# Patient Record
Sex: FEMALE | Employment: PART TIME | URBAN - METROPOLITAN AREA
[De-identification: names, ages, dates, MRNs, and addresses within clinical notes are randomized per-mention and may not be internally consistent; named-entity substitution may affect disease eponyms.]

---

## 2017-04-03 ENCOUNTER — TELEPHONE (OUTPATIENT)
Dept: FAMILY MEDICINE CLINIC | Facility: CLINIC | Age: 27
End: 2017-04-03

## 2017-04-03 NOTE — TELEPHONE ENCOUNTER
Pt would like another ultrasound done per your advice from August 2016.  First ultrasound was for pelvic pain and you told her to follow up in 6 months with another ultrasound

## 2017-04-03 NOTE — TELEPHONE ENCOUNTER
Pt would like an order to get a repeat ultra sound. Pt states that the last time she had an ultra sound done Dr. Samuel Snyder wanted her to have a repeat ultra sound. Please, call pt with any questions or when the order is in the system.

## 2017-04-22 ENCOUNTER — HOSPITAL ENCOUNTER (OUTPATIENT)
Dept: ULTRASOUND IMAGING | Facility: HOSPITAL | Age: 27
Discharge: HOME | End: 2017-04-22
Attending: FAMILY MEDICINE
Payer: COMMERCIAL

## 2017-04-22 DIAGNOSIS — N83.201 CYST OF RIGHT OVARY: ICD-10-CM

## 2017-04-22 PROCEDURE — 76856 US EXAM PELVIC COMPLETE: CPT

## 2017-04-22 PROCEDURE — 93975 VASCULAR STUDY: CPT

## 2017-04-22 PROCEDURE — 76830 TRANSVAGINAL US NON-OB: CPT

## 2017-04-23 ENCOUNTER — TELEPHONE (OUTPATIENT)
Dept: FAMILY MEDICINE CLINIC | Facility: CLINIC | Age: 27
End: 2017-04-23

## 2017-04-26 ENCOUNTER — TELEPHONE (OUTPATIENT)
Dept: FAMILY MEDICINE CLINIC | Facility: CLINIC | Age: 27
End: 2017-04-26

## 2017-04-26 DIAGNOSIS — N83.209 CYST OF OVARY, UNSPECIFIED LATERALITY: Primary | ICD-10-CM

## 2017-04-26 DIAGNOSIS — R93.89 ABNORMAL ULTRASOUND: ICD-10-CM

## 2017-04-26 NOTE — TELEPHONE ENCOUNTER
Patient is leaving for out of town on Friday cannot make apt and will not be back till May 8 th --  She was suppose to come into speak to doctor about her test results ( ovarian cyst )  Asked if she can speak to doctor over the phone

## 2017-04-27 NOTE — TELEPHONE ENCOUNTER
Please call the patient. She still has some cysts in the ovaries. Even though they are smaller. She needs to see Ob Gyn/ I will pend referral. Let me know.  Send her also recommendations and copy of the results

## 2017-10-10 ENCOUNTER — OFFICE VISIT (OUTPATIENT)
Dept: FAMILY MEDICINE CLINIC | Facility: CLINIC | Age: 27
End: 2017-10-10

## 2017-10-10 VITALS
WEIGHT: 175 LBS | HEART RATE: 74 BPM | SYSTOLIC BLOOD PRESSURE: 128 MMHG | TEMPERATURE: 98 F | DIASTOLIC BLOOD PRESSURE: 79 MMHG

## 2017-10-10 DIAGNOSIS — M25.561 RIGHT KNEE PAIN, UNSPECIFIED CHRONICITY: Primary | ICD-10-CM

## 2017-10-10 PROCEDURE — 99212 OFFICE O/P EST SF 10 MIN: CPT | Performed by: FAMILY MEDICINE

## 2017-10-10 PROCEDURE — 99213 OFFICE O/P EST LOW 20 MIN: CPT | Performed by: FAMILY MEDICINE

## 2017-10-10 RX ORDER — DOXYCYCLINE HYCLATE 150 MG/1
TABLET, DELAYED RELEASE ORAL
COMMUNITY
Start: 2017-09-23

## 2017-10-10 RX ORDER — ADAPALENE AND BENZOYL PEROXIDE 3; 25 MG/G; MG/G
GEL TOPICAL
COMMUNITY
Start: 2017-08-24

## 2017-10-10 NOTE — PROGRESS NOTES
HPI:    Patient ID: Aaron Garibay is a 23year old female. Few days ago started with the pain underneath the knee cap. Has pain. Worse with walking. [place] on it 2 times in the past but got better very fast. Now hurting for few days getting better.          R

## 2017-10-27 ENCOUNTER — HOSPITAL ENCOUNTER (OUTPATIENT)
Dept: GENERAL RADIOLOGY | Facility: HOSPITAL | Age: 27
Discharge: HOME | End: 2017-10-27
Attending: FAMILY MEDICINE
Payer: COMMERCIAL

## 2017-10-27 DIAGNOSIS — M25.561 RIGHT KNEE PAIN, UNSPECIFIED CHRONICITY: ICD-10-CM

## 2017-10-27 PROCEDURE — 73562 X-RAY EXAM OF KNEE 3: CPT | Performed by: FAMILY MEDICINE

## 2017-11-02 ENCOUNTER — TELEPHONE (OUTPATIENT)
Dept: FAMILY MEDICINE CLINIC | Facility: CLINIC | Age: 27
End: 2017-11-02

## 2017-11-10 NOTE — TELEPHONE ENCOUNTER
Pt called in to follow up on x-ray results. Pt states she had her x-ray on 10/27 and has not received a call back.

## 2017-11-13 NOTE — TELEPHONE ENCOUNTER
Notes Recorded by Kusum Monzon MD on 11/11/2017 at 6:42 PM CST  I spoke to the patient about the results. Patient is pain free now,   If starts having more pain advised to see ortho. Does not want to do it now.    Also strongly advised to loose weight

## 2018-01-12 ENCOUNTER — OFFICE VISIT (OUTPATIENT)
Dept: FAMILY MEDICINE CLINIC | Facility: CLINIC | Age: 28
End: 2018-01-12

## 2018-01-12 VITALS
SYSTOLIC BLOOD PRESSURE: 107 MMHG | WEIGHT: 175 LBS | HEART RATE: 69 BPM | DIASTOLIC BLOOD PRESSURE: 74 MMHG | TEMPERATURE: 97 F

## 2018-01-12 DIAGNOSIS — M54.89 OTHER BACK PAIN, UNSPECIFIED CHRONICITY: Primary | ICD-10-CM

## 2018-01-12 PROCEDURE — 99213 OFFICE O/P EST LOW 20 MIN: CPT | Performed by: FAMILY MEDICINE

## 2018-01-12 PROCEDURE — 99212 OFFICE O/P EST SF 10 MIN: CPT | Performed by: FAMILY MEDICINE

## 2018-01-12 RX ORDER — NAPROXEN 500 MG/1
500 TABLET ORAL 2 TIMES DAILY WITH MEALS
Qty: 50 TABLET | Refills: 0 | Status: SHIPPED | OUTPATIENT
Start: 2018-01-12 | End: 2019-03-29 | Stop reason: ALTCHOICE

## 2018-01-12 NOTE — PROGRESS NOTES
HPI:    Patient ID: Delfina Cason is a 33year old female. Left sided low back pain since few days ago. Worse when she changes position if kees walking better. No neuro deficits. Review of Systems   Constitutional: Negative.   Negative for activit

## 2019-03-29 ENCOUNTER — OFFICE VISIT (OUTPATIENT)
Dept: FAMILY MEDICINE CLINIC | Facility: CLINIC | Age: 29
End: 2019-03-29
Payer: COMMERCIAL

## 2019-03-29 VITALS
BODY MASS INDEX: 27.62 KG/M2 | SYSTOLIC BLOOD PRESSURE: 108 MMHG | TEMPERATURE: 97 F | WEIGHT: 176 LBS | HEART RATE: 79 BPM | HEIGHT: 67 IN | DIASTOLIC BLOOD PRESSURE: 71 MMHG

## 2019-03-29 DIAGNOSIS — M72.2 PLANTAR FASCIITIS: Primary | ICD-10-CM

## 2019-03-29 PROCEDURE — 99213 OFFICE O/P EST LOW 20 MIN: CPT | Performed by: PHYSICIAN ASSISTANT

## 2019-03-29 PROCEDURE — 99212 OFFICE O/P EST SF 10 MIN: CPT | Performed by: PHYSICIAN ASSISTANT

## 2019-03-29 NOTE — PROGRESS NOTES
HPI:    Patient ID: Graciela Martin is a 33year old female. Patient presents for the left foot pain for the past 3 days. Denies any trauma/injury to foot. She ran 1.5 miles the day before and a lot of walking on Sunday.  She iced the foot and Advil with no r effusion. Tenderness in plantar region of foot on medial side. No edema or leg swelling noted at this time. No pain in calf region b/l noted at this time. Neurological: She is alert and oriented to person, place, and time. She displays normal reflexes.

## 2019-03-29 NOTE — PATIENT INSTRUCTIONS
You can use ice and heat to help relieve some of the inflammation  You can take Advil for the pain and inflammation  Take tennis balls or towels that are rolled up tightly and massage the bottom of your feet every night to help with pain/tenderness.    You barefoot. · Premade or custom-fitted shoe inserts may be helpful. Inserts made of silicone seem to be the most effective.  Custom-made inserts can be provided by a podiatrist or foot specialist, physical therapist, or orthopedist.  · Premade or custom-made instructions.

## 2019-06-12 ENCOUNTER — OFFICE VISIT (OUTPATIENT)
Dept: FAMILY MEDICINE CLINIC | Facility: CLINIC | Age: 29
End: 2019-06-12
Payer: COMMERCIAL

## 2019-06-12 VITALS
TEMPERATURE: 98 F | BODY MASS INDEX: 28 KG/M2 | SYSTOLIC BLOOD PRESSURE: 110 MMHG | WEIGHT: 180 LBS | HEART RATE: 60 BPM | DIASTOLIC BLOOD PRESSURE: 73 MMHG

## 2019-06-12 DIAGNOSIS — Z30.9 ENCOUNTER FOR CONTRACEPTIVE MANAGEMENT, UNSPECIFIED TYPE: Primary | ICD-10-CM

## 2019-06-12 PROCEDURE — 99212 OFFICE O/P EST SF 10 MIN: CPT | Performed by: FAMILY MEDICINE

## 2019-06-12 PROCEDURE — 99213 OFFICE O/P EST LOW 20 MIN: CPT | Performed by: FAMILY MEDICINE

## 2019-06-12 RX ORDER — NORGESTIMATE AND ETHINYL ESTRADIOL 7DAYSX3 LO
1 KIT ORAL DAILY
Qty: 3 PACKAGE | Refills: 1 | Status: SHIPPED | OUTPATIENT
Start: 2019-06-12 | End: 2019-11-22

## 2019-06-12 RX ORDER — CLINDAMYCIN PHOSPHATE 10 MG/ML
SOLUTION TOPICAL
Refills: 4 | COMMUNITY
Start: 2019-03-17

## 2019-06-12 NOTE — PROGRESS NOTES
HPI:    Patient ID: Venkat Tsang is a 33year old female. Patient here for bcp. Needs info. Needs pap test and will come next month. Otherwise well      Review of Systems   Constitutional: Negative.   Negative for activity change, chills and diaphoresi

## 2019-06-19 ENCOUNTER — OFFICE VISIT (OUTPATIENT)
Dept: FAMILY MEDICINE CLINIC | Facility: CLINIC | Age: 29
End: 2019-06-19
Payer: COMMERCIAL

## 2019-06-19 VITALS
HEIGHT: 67 IN | BODY MASS INDEX: 28.09 KG/M2 | SYSTOLIC BLOOD PRESSURE: 106 MMHG | DIASTOLIC BLOOD PRESSURE: 70 MMHG | WEIGHT: 179 LBS | HEART RATE: 74 BPM

## 2019-06-19 DIAGNOSIS — Z02.1 PHYSICAL EXAM, PRE-EMPLOYMENT: Primary | ICD-10-CM

## 2019-06-19 PROCEDURE — 99395 PREV VISIT EST AGE 18-39: CPT | Performed by: FAMILY MEDICINE

## 2019-06-19 NOTE — PROGRESS NOTES
HPI:    Patient ID: Joy Rueda is a 33year old female. Here for work physical. No complaints      Review of Systems   Constitutional: Negative. Negative for activity change, appetite change, diaphoresis, fatigue and fever. HENT: Negative.     [facility]

## 2019-11-22 RX ORDER — NORGESTIMATE AND ETHINYL ESTRADIOL
KIT
Qty: 84 TABLET | Refills: 1 | Status: SHIPPED | OUTPATIENT
Start: 2019-11-22 | End: 2020-05-11

## 2019-11-22 NOTE — TELEPHONE ENCOUNTER
Please review; protocol failed.   LR 6/12/19    Gynecology Medications  Protocol Criteria:  · Appointment scheduled in the past 12 months or the next 3 months  · Pap smear in the past 12 months  · Pap smear WNL manually verified  Recent Outpatient Visits

## 2020-05-11 RX ORDER — NORGESTIMATE AND ETHINYL ESTRADIOL
KIT
Qty: 28 TABLET | Refills: 0 | Status: SHIPPED | OUTPATIENT
Start: 2020-05-11 | End: 2020-06-09

## 2020-05-29 ENCOUNTER — VIRTUAL PHONE E/M (OUTPATIENT)
Dept: FAMILY MEDICINE CLINIC | Facility: CLINIC | Age: 30
End: 2020-05-29
Payer: COMMERCIAL

## 2020-05-29 ENCOUNTER — NURSE TRIAGE (OUTPATIENT)
Dept: FAMILY MEDICINE CLINIC | Facility: CLINIC | Age: 30
End: 2020-05-29

## 2020-05-29 ENCOUNTER — TELEPHONE (OUTPATIENT)
Dept: FAMILY MEDICINE CLINIC | Facility: CLINIC | Age: 30
End: 2020-05-29

## 2020-05-29 DIAGNOSIS — Z20.822 EXPOSURE TO COVID-19 VIRUS: Primary | ICD-10-CM

## 2020-05-29 PROCEDURE — 99213 OFFICE O/P EST LOW 20 MIN: CPT | Performed by: PHYSICIAN ASSISTANT

## 2020-05-29 NOTE — PROGRESS NOTES
Please note that the following visit was completed using two-way, real-time interactive audio and/or video communication.   This has been done in good kaveh to provide continuity of care in the best interest of the provider-patient relationship, due to the and negatives noted in the the HPI.          Current Outpatient Medications   Medication Sig Dispense Refill   • TRI-LO-MAAME 0.18/0.215/0.25 MG-25 MCG Oral Tab TAKE 1 TABLET BY MOUTH EVERY DAY 28 tablet 0   • Clindamycin Phosphate 1 % External Swab APPLY meetings/ gatherings in face of this Covid 19 pandemic. Patient verbalized understanding of plan and all questions answered to the best of my ability. Patient to call back if any change/ worsening of symptoms.     No orders of the defined types were p

## 2020-05-29 NOTE — TELEPHONE ENCOUNTER
Action Requested: Summary for Provider     []  Critical Lab, Recommendations Needed  [] Need Additional Advice  []   FYI    []   Need Orders  [] Need Medications Sent to Pharmacy  []  Other     SUMMARY: Televisit scheduled.      Reason for call: Headache (c

## 2020-06-01 ENCOUNTER — TELEPHONE (OUTPATIENT)
Dept: FAMILY MEDICINE CLINIC | Facility: CLINIC | Age: 30
End: 2020-06-01

## 2020-06-01 NOTE — TELEPHONE ENCOUNTER
Reports had virtual visit with Blaine Chavez [place] and reports her COVID-19 test result is delayed--reports out-side facility \"is backed up. \" Pt states has been symptom free since yesterday (HA, sore throat, diarrhea resolved) and reports wants to return to work

## 2020-06-01 NOTE — TELEPHONE ENCOUNTER
Agree. Patient needs to get results first to ensure she did not have COVID. Depending on results we determine when she can return to work.

## 2020-06-09 RX ORDER — NORGESTIMATE AND ETHINYL ESTRADIOL
KIT
Qty: 28 TABLET | Refills: 0 | Status: SHIPPED | OUTPATIENT
Start: 2020-06-09 | End: 2020-07-08

## 2020-07-08 RX ORDER — NORGESTIMATE AND ETHINYL ESTRADIOL
KIT
Qty: 28 TABLET | Refills: 0 | Status: SHIPPED | OUTPATIENT
Start: 2020-07-08 | End: 2020-08-18

## 2020-08-05 RX ORDER — NORGESTIMATE AND ETHINYL ESTRADIOL
KIT
Qty: 28 TABLET | Refills: 0 | OUTPATIENT
Start: 2020-08-05

## 2020-08-18 RX ORDER — NORGESTIMATE AND ETHINYL ESTRADIOL
KIT
Qty: 28 TABLET | Refills: 0 | Status: SHIPPED | OUTPATIENT
Start: 2020-08-18 | End: 2020-09-08

## 2020-09-08 RX ORDER — NORGESTIMATE AND ETHINYL ESTRADIOL
KIT
Qty: 28 TABLET | Refills: 0 | Status: SHIPPED | OUTPATIENT
Start: 2020-09-08

## 2020-10-06 RX ORDER — NORGESTIMATE AND ETHINYL ESTRADIOL
KIT
Qty: 28 TABLET | Refills: 0 | OUTPATIENT
Start: 2020-10-06

## 2021-01-30 ENCOUNTER — OFFICE VISIT (OUTPATIENT)
Dept: FAMILY MEDICINE CLINIC | Facility: CLINIC | Age: 31
End: 2021-01-30
Payer: COMMERCIAL

## 2021-01-30 VITALS
SYSTOLIC BLOOD PRESSURE: 131 MMHG | HEART RATE: 86 BPM | BODY MASS INDEX: 31.55 KG/M2 | HEIGHT: 67 IN | DIASTOLIC BLOOD PRESSURE: 79 MMHG | WEIGHT: 201 LBS

## 2021-01-30 DIAGNOSIS — Z32.01 POSITIVE PREGNANCY TEST: Primary | ICD-10-CM

## 2021-01-30 LAB
CONTROL LINE PRESENT WITH A CLEAR BACKGROUND (YES/NO): YES YES/NO
PREGNANCY TEST, URINE: POSITIVE

## 2021-01-30 PROCEDURE — 99213 OFFICE O/P EST LOW 20 MIN: CPT | Performed by: FAMILY MEDICINE

## 2021-01-30 PROCEDURE — 3075F SYST BP GE 130 - 139MM HG: CPT | Performed by: FAMILY MEDICINE

## 2021-01-30 PROCEDURE — 3078F DIAST BP <80 MM HG: CPT | Performed by: FAMILY MEDICINE

## 2021-01-30 PROCEDURE — 3008F BODY MASS INDEX DOCD: CPT | Performed by: FAMILY MEDICINE

## 2021-01-30 PROCEDURE — 81025 URINE PREGNANCY TEST: CPT | Performed by: FAMILY MEDICINE

## 2021-01-30 RX ORDER — AZELAIC ACID 0.15 G/G
GEL TOPICAL
COMMUNITY
Start: 2021-01-28

## 2021-01-30 NOTE — PROGRESS NOTES
HPI:    Patient ID: Aysha Stover is a 35year old female. Patient pregnant. No any abdominal pain cramps, menstrual bleeding. Does not want to keep the pregnancy. Review of Systems  Constitutional: Negative.   Negative for activity change, appeti

## 2022-06-01 ENCOUNTER — TELEPHONE (OUTPATIENT)
Dept: FAMILY MEDICINE CLINIC | Facility: CLINIC | Age: 32
End: 2022-06-01

## 2022-06-01 ENCOUNTER — NURSE TRIAGE (OUTPATIENT)
Dept: FAMILY MEDICINE CLINIC | Facility: CLINIC | Age: 32
End: 2022-06-01

## 2022-06-01 LAB — AMB EXT COVID-19 RESULT: DETECTED

## 2022-06-01 NOTE — TELEPHONE ENCOUNTER
Patient spoke with nurse this morning and would like note to return to work 6/6, please send through luiz Singer.

## 2022-12-16 ENCOUNTER — TELEMEDICINE (OUTPATIENT)
Dept: FAMILY MEDICINE CLINIC | Facility: CLINIC | Age: 32
End: 2022-12-16
Payer: COMMERCIAL

## 2022-12-16 ENCOUNTER — TELEPHONE (OUTPATIENT)
Dept: FAMILY MEDICINE CLINIC | Facility: CLINIC | Age: 32
End: 2022-12-16

## 2022-12-16 DIAGNOSIS — M79.673 PAIN OF FOOT, UNSPECIFIED LATERALITY: Primary | ICD-10-CM

## 2022-12-16 RX ORDER — MELOXICAM 15 MG/1
15 TABLET ORAL DAILY
Qty: 20 TABLET | Refills: 0 | Status: SHIPPED | OUTPATIENT
Start: 2022-12-16

## 2022-12-19 NOTE — TELEPHONE ENCOUNTER
Letter sent to anthony leo
Patient called office, saying she missed a call. Patient's date of birth and full name both confirmed. Chart reviewed. No notes - chart review , test results, Appointment Desk. RN suggested patient to listen to voicemail. She is also looking for letter. RN advised that this letter was release to her earlier today around 11am.. Advised her to restart her mychart. Provided mychart help desk number. She verbalizes understanding of all information, and agreeable to plan.
Patient states can not see letter in [address], please resend.
No

## 2023-03-01 ENCOUNTER — NURSE TRIAGE (OUTPATIENT)
Dept: FAMILY MEDICINE CLINIC | Facility: CLINIC | Age: 33
End: 2023-03-01

## 2023-03-01 ENCOUNTER — OFFICE VISIT (OUTPATIENT)
Dept: FAMILY MEDICINE CLINIC | Facility: CLINIC | Age: 33
End: 2023-03-01

## 2023-03-01 ENCOUNTER — LAB ENCOUNTER (OUTPATIENT)
Dept: LAB | Age: 33
End: 2023-03-01
Attending: PHYSICIAN ASSISTANT
Payer: COMMERCIAL

## 2023-03-01 VITALS
WEIGHT: 209 LBS | HEIGHT: 67 IN | BODY MASS INDEX: 32.8 KG/M2 | SYSTOLIC BLOOD PRESSURE: 127 MMHG | DIASTOLIC BLOOD PRESSURE: 86 MMHG | HEART RATE: 81 BPM

## 2023-03-01 DIAGNOSIS — N83.201 CYSTS OF BOTH OVARIES: ICD-10-CM

## 2023-03-01 DIAGNOSIS — R10.84 GENERALIZED ABDOMINAL PAIN: ICD-10-CM

## 2023-03-01 DIAGNOSIS — R10.31 RLQ ABDOMINAL PAIN: ICD-10-CM

## 2023-03-01 DIAGNOSIS — R82.90 ABNORMAL URINALYSIS: ICD-10-CM

## 2023-03-01 DIAGNOSIS — N83.202 CYSTS OF BOTH OVARIES: ICD-10-CM

## 2023-03-01 DIAGNOSIS — R10.2 PELVIC PAIN: Primary | ICD-10-CM

## 2023-03-01 LAB
ALBUMIN SERPL-MCNC: 3.7 G/DL (ref 3.4–5)
ALBUMIN/GLOB SERPL: 0.9 {RATIO} (ref 1–2)
ALP LIVER SERPL-CCNC: 90 U/L
ALT SERPL-CCNC: 31 U/L
ANION GAP SERPL CALC-SCNC: 10 MMOL/L (ref 0–18)
APPEARANCE: CLEAR
AST SERPL-CCNC: 17 U/L (ref 15–37)
BASOPHILS # BLD AUTO: 0.06 X10(3) UL (ref 0–0.2)
BASOPHILS NFR BLD AUTO: 0.5 %
BILIRUB SERPL-MCNC: 0.3 MG/DL (ref 0.1–2)
BILIRUBIN: NEGATIVE
BUN BLD-MCNC: 14 MG/DL (ref 7–18)
BUN/CREAT SERPL: 22.2 (ref 10–20)
CALCIUM BLD-MCNC: 9 MG/DL (ref 8.5–10.1)
CHLORIDE SERPL-SCNC: 106 MMOL/L (ref 98–112)
CO2 SERPL-SCNC: 24 MMOL/L (ref 21–32)
CREAT BLD-MCNC: 0.63 MG/DL
DEPRECATED RDW RBC AUTO: 39.2 FL (ref 35.1–46.3)
EOSINOPHIL # BLD AUTO: 0.07 X10(3) UL (ref 0–0.7)
EOSINOPHIL NFR BLD AUTO: 0.6 %
ERYTHROCYTE [DISTWIDTH] IN BLOOD BY AUTOMATED COUNT: 11.9 % (ref 11–15)
FASTING STATUS PATIENT QL REPORTED: YES
GFR SERPLBLD BASED ON 1.73 SQ M-ARVRAT: 122 ML/MIN/1.73M2 (ref 60–?)
GLOBULIN PLAS-MCNC: 3.9 G/DL (ref 2.8–4.4)
GLUCOSE (URINE DIPSTICK): NEGATIVE MG/DL
GLUCOSE BLD-MCNC: 81 MG/DL (ref 70–99)
HCT VFR BLD AUTO: 40.1 %
HGB BLD-MCNC: 13.1 G/DL
IMM GRANULOCYTES # BLD AUTO: 0.04 X10(3) UL (ref 0–1)
IMM GRANULOCYTES NFR BLD: 0.4 %
KETONES (URINE DIPSTICK): NEGATIVE MG/DL
LIPASE SERPL-CCNC: 111 U/L (ref 73–393)
LIPASE SERPL-CCNC: 26 U/L (ref 13–75)
LYMPHOCYTES # BLD AUTO: 2.27 X10(3) UL (ref 1–4)
LYMPHOCYTES NFR BLD AUTO: 20.1 %
MCH RBC QN AUTO: 29.5 PG (ref 26–34)
MCHC RBC AUTO-ENTMCNC: 32.7 G/DL (ref 31–37)
MCV RBC AUTO: 90.3 FL
MONOCYTES # BLD AUTO: 0.66 X10(3) UL (ref 0.1–1)
MONOCYTES NFR BLD AUTO: 5.9 %
MULTISTIX LOT#: ABNORMAL NUMERIC
NEUTROPHILS # BLD AUTO: 8.18 X10 (3) UL (ref 1.5–7.7)
NEUTROPHILS # BLD AUTO: 8.18 X10(3) UL (ref 1.5–7.7)
NEUTROPHILS NFR BLD AUTO: 72.5 %
NITRITE, URINE: NEGATIVE
OSMOLALITY SERPL CALC.SUM OF ELEC: 290 MOSM/KG (ref 275–295)
PH, URINE: 6.5 (ref 4.5–8)
PLATELET # BLD AUTO: 343 10(3)UL (ref 150–450)
POTASSIUM SERPL-SCNC: 3.7 MMOL/L (ref 3.5–5.1)
PROT SERPL-MCNC: 7.6 G/DL (ref 6.4–8.2)
RBC # BLD AUTO: 4.44 X10(6)UL
SODIUM SERPL-SCNC: 140 MMOL/L (ref 136–145)
SPECIFIC GRAVITY: 1.02 (ref 1–1.03)
URINE-COLOR: YELLOW
UROBILINOGEN,SEMI-QN: 0.2 MG/DL (ref 0–1.9)
WBC # BLD AUTO: 11.3 X10(3) UL (ref 4–11)

## 2023-03-01 PROCEDURE — 80053 COMPREHEN METABOLIC PANEL: CPT

## 2023-03-01 PROCEDURE — 3079F DIAST BP 80-89 MM HG: CPT | Performed by: PHYSICIAN ASSISTANT

## 2023-03-01 PROCEDURE — 3074F SYST BP LT 130 MM HG: CPT | Performed by: PHYSICIAN ASSISTANT

## 2023-03-01 PROCEDURE — 36415 COLL VENOUS BLD VENIPUNCTURE: CPT

## 2023-03-01 PROCEDURE — 85025 COMPLETE CBC W/AUTO DIFF WBC: CPT

## 2023-03-01 PROCEDURE — 3008F BODY MASS INDEX DOCD: CPT | Performed by: PHYSICIAN ASSISTANT

## 2023-03-01 PROCEDURE — 83690 ASSAY OF LIPASE: CPT

## 2023-03-01 PROCEDURE — 99214 OFFICE O/P EST MOD 30 MIN: CPT | Performed by: PHYSICIAN ASSISTANT

## 2023-03-01 PROCEDURE — 81002 URINALYSIS NONAUTO W/O SCOPE: CPT | Performed by: PHYSICIAN ASSISTANT

## 2023-03-01 RX ORDER — NAPROXEN 500 MG/1
500 TABLET ORAL 2 TIMES DAILY WITH MEALS
Qty: 60 TABLET | Refills: 0 | Status: SHIPPED | OUTPATIENT
Start: 2023-03-01

## 2023-03-02 ENCOUNTER — TELEPHONE (OUTPATIENT)
Dept: FAMILY MEDICINE CLINIC | Facility: CLINIC | Age: 33
End: 2023-03-02

## 2023-03-02 DIAGNOSIS — R10.2 PAIN IN PELVIS: Primary | ICD-10-CM

## 2023-03-02 NOTE — TELEPHONE ENCOUNTER
Spoke with patient,  verified. Informed of new STAT order. She will call to schedule. Pain is still there, rated 5/10. Advised her to call with update in morning.

## 2023-03-02 NOTE — TELEPHONE ENCOUNTER
Patient called, states that she was scheduled on 3/6/23 for an [address] but they called her and cancelled it and now she is scheduled on 3/21/23. Was instructed to call PCP to change the order to STAT if she would like an earlier appointment. Per patient, she is still in pain and would like to do it earlier than 3/21/23, requesting STAT order . Informed that she is still scheduled for 3/6/23 and 3/21/23. Per patient ,the US tech told her that they needed longer time for the doppler that is why they have to changed it to 3/21/23. Manuel Restrepo Patient would like to know what was the season  the order was changed-Informed that the previous order was the wrong test ,now changed to US pelvis with doppler. RN called [facility] (central scheduling ) and left a complete message including patient's name and , our office number and hour to call us back regarding the schedule. ONCE ROCIO CALLED BACK=please clarify if we could use the 3/6/23 for the [address] with doppler or any earlier appointment than 3/21/23. If there is no earlier appointment, please asks . Rosa Elena Parra, if she could change it to STAT.          Future Appointments   Date Time Provider Jong Dunham   3/6/2023  7:30 AM ADO US RM1 ADO US NESSA Sadler   3/8/2023 10:30 [facility]   3/21/2023  1:00 PM [address] [place] [address]

## 2023-03-02 NOTE — TELEPHONE ENCOUNTER
Mick Conte, please advise    Do you want the ultrasound order to be changed to \"Stat\"? Patient is calling regarding pain and waiting for next ultrasound appt. See Message below. Appt for ultrasound is 3/21/23. ONCE ROCIO CALLED BACK=please clarify if we could use the 3/6/23 for the [address] with doppler or any earlier appointment than 3/21/23. If there is no earlier appointment, please asks . Izzy Rojas, if she could change it to STAT. Central Scheduling is calling to confirm that the ultrasound order was changed by Provider to a pelvic ultrasound. Per chart review, order was changed after office visit 3/1/23.

## 2023-03-02 NOTE — TELEPHONE ENCOUNTER
Patient requesting call from office states she scheduled her ultrasound but it was canceled due to order being canceled.

## 2023-03-03 ENCOUNTER — HOSPITAL ENCOUNTER (OUTPATIENT)
Dept: ULTRASOUND IMAGING | Facility: HOSPITAL | Age: 33
Discharge: HOME | End: 2023-03-03
Attending: PHYSICIAN ASSISTANT
Payer: COMMERCIAL

## 2023-03-03 DIAGNOSIS — R10.2 PAIN IN PELVIS: ICD-10-CM

## 2023-03-03 PROCEDURE — 76830 TRANSVAGINAL US NON-OB: CPT | Performed by: PHYSICIAN ASSISTANT

## 2023-03-03 PROCEDURE — 93975 VASCULAR STUDY: CPT | Performed by: PHYSICIAN ASSISTANT

## 2023-03-03 PROCEDURE — 76856 US EXAM PELVIC COMPLETE: CPT | Performed by: PHYSICIAN ASSISTANT

## 2023-03-03 RX ORDER — TRAMADOL HYDROCHLORIDE 50 MG/1
50 TABLET ORAL EVERY 6 HOURS PRN
Qty: 15 TABLET | Refills: 0 | Status: SHIPPED | OUTPATIENT
Start: 2023-03-03

## 2023-03-08 ENCOUNTER — OFFICE VISIT (OUTPATIENT)
Dept: FAMILY MEDICINE CLINIC | Facility: CLINIC | Age: 33
End: 2023-03-08

## 2023-03-08 VITALS
SYSTOLIC BLOOD PRESSURE: 118 MMHG | RESPIRATION RATE: 20 BRPM | DIASTOLIC BLOOD PRESSURE: 74 MMHG | HEART RATE: 77 BPM | HEIGHT: 67 IN | BODY MASS INDEX: 33.12 KG/M2 | OXYGEN SATURATION: 98 % | WEIGHT: 211 LBS

## 2023-03-08 DIAGNOSIS — R68.84 JAW PAIN: Primary | ICD-10-CM

## 2023-03-08 PROCEDURE — 99213 OFFICE O/P EST LOW 20 MIN: CPT | Performed by: FAMILY MEDICINE

## 2023-03-08 PROCEDURE — 3074F SYST BP LT 130 MM HG: CPT | Performed by: FAMILY MEDICINE

## 2023-03-08 PROCEDURE — 3078F DIAST BP <80 MM HG: CPT | Performed by: FAMILY MEDICINE

## 2023-03-08 PROCEDURE — 3008F BODY MASS INDEX DOCD: CPT | Performed by: FAMILY MEDICINE

## 2023-04-21 ENCOUNTER — OFFICE VISIT (OUTPATIENT)
Dept: OBGYN CLINIC | Facility: CLINIC | Age: 33
End: 2023-04-21

## 2023-04-21 VITALS
BODY MASS INDEX: 33 KG/M2 | SYSTOLIC BLOOD PRESSURE: 127 MMHG | HEART RATE: 79 BPM | WEIGHT: 212.63 LBS | DIASTOLIC BLOOD PRESSURE: 81 MMHG

## 2023-04-21 DIAGNOSIS — R10.2 PELVIC PAIN: Primary | ICD-10-CM

## 2023-04-21 PROCEDURE — 3079F DIAST BP 80-89 MM HG: CPT | Performed by: OBSTETRICS & GYNECOLOGY

## 2023-04-21 PROCEDURE — 99202 OFFICE O/P NEW SF 15 MIN: CPT | Performed by: OBSTETRICS & GYNECOLOGY

## 2023-04-21 PROCEDURE — 3074F SYST BP LT 130 MM HG: CPT | Performed by: OBSTETRICS & GYNECOLOGY

## 2023-04-24 ENCOUNTER — OFFICE VISIT (OUTPATIENT)
Dept: OTOLARYNGOLOGY | Facility: CLINIC | Age: 33
End: 2023-04-24

## 2023-04-24 VITALS — BODY MASS INDEX: 33.27 KG/M2 | WEIGHT: 212 LBS | HEIGHT: 67 IN

## 2023-04-24 DIAGNOSIS — R59.0 SUBMENTAL LYMPHADENOPATHY: Primary | ICD-10-CM

## 2023-04-24 PROCEDURE — 99243 OFF/OP CNSLTJ NEW/EST LOW 30: CPT | Performed by: STUDENT IN AN ORGANIZED HEALTH CARE EDUCATION/TRAINING PROGRAM

## 2023-04-24 PROCEDURE — 3008F BODY MASS INDEX DOCD: CPT | Performed by: STUDENT IN AN ORGANIZED HEALTH CARE EDUCATION/TRAINING PROGRAM

## 2023-05-03 NOTE — TELEPHONE ENCOUNTER
Also, Naproxen helps to control the pain? Otherwise, patient may try Tramadol. Patient: Jose Francisco Gonzalez  : 1937      ORDERS:    BMP on  (hyponatremia, hypotension)        DEE DEE Schilling CNP on 5/3/2023 at 1:08 PM

## 2023-05-11 ENCOUNTER — HOSPITAL ENCOUNTER (OUTPATIENT)
Dept: ULTRASOUND IMAGING | Age: 33
Discharge: HOME | End: 2023-05-11
Attending: STUDENT IN AN ORGANIZED HEALTH CARE EDUCATION/TRAINING PROGRAM
Payer: COMMERCIAL

## 2023-05-11 DIAGNOSIS — R59.0 SUBMENTAL LYMPHADENOPATHY: ICD-10-CM

## 2023-05-11 PROCEDURE — 76536 US EXAM OF HEAD AND NECK: CPT | Performed by: STUDENT IN AN ORGANIZED HEALTH CARE EDUCATION/TRAINING PROGRAM

## 2024-06-06 ENCOUNTER — OFFICE VISIT (OUTPATIENT)
Dept: FAMILY MEDICINE CLINIC | Facility: CLINIC | Age: 34
End: 2024-06-06
Payer: COMMERCIAL

## 2024-06-06 ENCOUNTER — LAB ENCOUNTER (OUTPATIENT)
Dept: LAB | Age: 34
End: 2024-06-06
Attending: FAMILY MEDICINE
Payer: COMMERCIAL

## 2024-06-06 VITALS
SYSTOLIC BLOOD PRESSURE: 118 MMHG | DIASTOLIC BLOOD PRESSURE: 74 MMHG | HEIGHT: 67 IN | WEIGHT: 198.81 LBS | RESPIRATION RATE: 18 BRPM | OXYGEN SATURATION: 98 % | BODY MASS INDEX: 31.2 KG/M2 | HEART RATE: 72 BPM

## 2024-06-06 DIAGNOSIS — L80 VITILIGO: ICD-10-CM

## 2024-06-06 DIAGNOSIS — N83.209 CYST OF OVARY, UNSPECIFIED LATERALITY: Primary | ICD-10-CM

## 2024-06-06 DIAGNOSIS — Z00.00 ANNUAL PHYSICAL EXAM: ICD-10-CM

## 2024-06-06 LAB
ALBUMIN SERPL-MCNC: 4.5 G/DL (ref 3.2–4.8)
ALBUMIN/GLOB SERPL: 1.5 {RATIO} (ref 1–2)
ALP LIVER SERPL-CCNC: 79 U/L
ALT SERPL-CCNC: 26 U/L
ANION GAP SERPL CALC-SCNC: 9 MMOL/L (ref 0–18)
AST SERPL-CCNC: 21 U/L (ref ?–34)
BASOPHILS # BLD AUTO: 0.06 X10(3) UL (ref 0–0.2)
BASOPHILS NFR BLD AUTO: 0.6 %
BILIRUB SERPL-MCNC: 0.5 MG/DL (ref 0.3–1.2)
BUN BLD-MCNC: 8 MG/DL (ref 9–23)
BUN/CREAT SERPL: 11 (ref 10–20)
CALCIUM BLD-MCNC: 9.5 MG/DL (ref 8.7–10.4)
CHLORIDE SERPL-SCNC: 106 MMOL/L (ref 98–112)
CHOLEST SERPL-MCNC: 164 MG/DL (ref ?–200)
CO2 SERPL-SCNC: 24 MMOL/L (ref 21–32)
CREAT BLD-MCNC: 0.73 MG/DL
DEPRECATED RDW RBC AUTO: 40.8 FL (ref 35.1–46.3)
EGFRCR SERPLBLD CKD-EPI 2021: 112 ML/MIN/1.73M2 (ref 60–?)
EOSINOPHIL # BLD AUTO: 0.16 X10(3) UL (ref 0–0.7)
EOSINOPHIL NFR BLD AUTO: 1.6 %
ERYTHROCYTE [DISTWIDTH] IN BLOOD BY AUTOMATED COUNT: 12.8 % (ref 11–15)
EST. AVERAGE GLUCOSE BLD GHB EST-MCNC: 100 MG/DL (ref 68–126)
FASTING PATIENT LIPID ANSWER: YES
FASTING STATUS PATIENT QL REPORTED: YES
GLOBULIN PLAS-MCNC: 3 G/DL (ref 2–3.5)
GLUCOSE BLD-MCNC: 95 MG/DL (ref 70–99)
HBA1C MFR BLD: 5.1 % (ref ?–5.7)
HCT VFR BLD AUTO: 42.2 %
HDLC SERPL-MCNC: 32 MG/DL (ref 40–59)
HGB BLD-MCNC: 13.4 G/DL
IMM GRANULOCYTES # BLD AUTO: 0.04 X10(3) UL (ref 0–1)
IMM GRANULOCYTES NFR BLD: 0.4 %
LDLC SERPL CALC-MCNC: 114 MG/DL (ref ?–100)
LYMPHOCYTES # BLD AUTO: 2.92 X10(3) UL (ref 1–4)
LYMPHOCYTES NFR BLD AUTO: 29.2 %
MCH RBC QN AUTO: 27.8 PG (ref 26–34)
MCHC RBC AUTO-ENTMCNC: 31.8 G/DL (ref 31–37)
MCV RBC AUTO: 87.6 FL
MONOCYTES # BLD AUTO: 0.68 X10(3) UL (ref 0.1–1)
MONOCYTES NFR BLD AUTO: 6.8 %
NEUTROPHILS # BLD AUTO: 6.14 X10 (3) UL (ref 1.5–7.7)
NEUTROPHILS # BLD AUTO: 6.14 X10(3) UL (ref 1.5–7.7)
NEUTROPHILS NFR BLD AUTO: 61.4 %
NONHDLC SERPL-MCNC: 132 MG/DL (ref ?–130)
OSMOLALITY SERPL CALC.SUM OF ELEC: 286 MOSM/KG (ref 275–295)
PLATELET # BLD AUTO: 385 10(3)UL (ref 150–450)
POTASSIUM SERPL-SCNC: 4.9 MMOL/L (ref 3.5–5.1)
PROT SERPL-MCNC: 7.5 G/DL (ref 5.7–8.2)
RBC # BLD AUTO: 4.82 X10(6)UL
SODIUM SERPL-SCNC: 139 MMOL/L (ref 136–145)
TRIGL SERPL-MCNC: 98 MG/DL (ref 30–149)
TSI SER-ACNC: 1.48 MIU/ML (ref 0.55–4.78)
VLDLC SERPL CALC-MCNC: 17 MG/DL (ref 0–30)
WBC # BLD AUTO: 10 X10(3) UL (ref 4–11)

## 2024-06-06 PROCEDURE — 80061 LIPID PANEL: CPT

## 2024-06-06 PROCEDURE — 3074F SYST BP LT 130 MM HG: CPT | Performed by: FAMILY MEDICINE

## 2024-06-06 PROCEDURE — 83036 HEMOGLOBIN GLYCOSYLATED A1C: CPT

## 2024-06-06 PROCEDURE — 85025 COMPLETE CBC W/AUTO DIFF WBC: CPT

## 2024-06-06 PROCEDURE — 99395 PREV VISIT EST AGE 18-39: CPT | Performed by: FAMILY MEDICINE

## 2024-06-06 PROCEDURE — 84443 ASSAY THYROID STIM HORMONE: CPT

## 2024-06-06 PROCEDURE — 36415 COLL VENOUS BLD VENIPUNCTURE: CPT

## 2024-06-06 PROCEDURE — 3008F BODY MASS INDEX DOCD: CPT | Performed by: FAMILY MEDICINE

## 2024-06-06 PROCEDURE — 80053 COMPREHEN METABOLIC PANEL: CPT

## 2024-06-06 PROCEDURE — 3078F DIAST BP <80 MM HG: CPT | Performed by: FAMILY MEDICINE

## 2024-06-06 NOTE — PROGRESS NOTES
Subjective:   Patient ID: Stephanie Belle is a 33 year old female.  Here for annual physical   Also having vitiligo on several spots   Never had repeat us pelvis as recommended  HPI    History/Other:   Review of Systems    Constitutional: Negative.  Negative for activity change, appetite change, diaphoresis and fatigue.     Respiratory: Negative.  Negative for apnea, cough, chest tightness and shortness of breath.    Cardiovascular: Negative.  Negative for chest pain, palpitations and leg swelling.   Gastrointestinal: Negative.  Negative for abdominal pain.   Skin:see hpi          Psychiatric/Behavioral: Negative.          Current Outpatient Medications   Medication Sig Dispense Refill    traMADol 50 MG Oral Tab Take 1 tablet (50 mg total) by mouth every 6 (six) hours as needed for Pain. (Patient not taking: Reported on 4/24/2023) 15 tablet 0    naproxen 500 MG Oral Tab Take 1 tablet (500 mg total) by mouth 2 (two) times daily with meals. (Patient not taking: Reported on 4/21/2023) 60 tablet 0     Allergies:No Known Allergies    Objective:   Physical Exam  Constitutional:       Appearance: She is well-developed.   Cardiovascular:      Rate and Rhythm: Normal rate and regular rhythm.      Heart sounds: Normal heart sounds.   Pulmonary:      Effort: Pulmonary effort is normal.      Breath sounds: Normal breath sounds.   Abdominal:      General: Bowel sounds are normal.      Palpations: Abdomen is soft.   Skin:     Comments: Hypopigmented patches    Neurological:      Mental Status: She is alert and oriented to person, place, and time.      Deep Tendon Reflexes: Reflexes are normal and symmetric.         Assessment & Plan:   1. Cyst of ovary, unspecified laterality    2. Vitiligo    3. Annual physical exam    Repat us   Dermatologist   Diet and exercise discussed    Orders Placed This Encounter   Procedures    Comp Metabolic Panel (14)    Hemoglobin A1C    Lipid Panel    Assay, Thyroid Stim Hormone    CBC With Differential  With Platelet       Meds This Visit:  Requested Prescriptions      No prescriptions requested or ordered in this encounter       Imaging & Referrals:  DERM - INTERNAL  US PELVIS (TRANSVAGINAL ONLY) (CPT=76830)

## 2024-06-18 ENCOUNTER — OFFICE VISIT (OUTPATIENT)
Dept: DERMATOLOGY CLINIC | Facility: CLINIC | Age: 34
End: 2024-06-18

## 2024-06-18 DIAGNOSIS — L80 VITILIGO: Primary | ICD-10-CM

## 2024-06-18 PROCEDURE — 99213 OFFICE O/P EST LOW 20 MIN: CPT | Performed by: PHYSICIAN ASSISTANT

## 2024-06-18 RX ORDER — TRIAMCINOLONE ACETONIDE 0.25 MG/G
1 CREAM TOPICAL 2 TIMES DAILY
Qty: 454 G | Refills: 0 | Status: SHIPPED | OUTPATIENT
Start: 2024-06-18

## 2024-06-18 RX ORDER — TACROLIMUS 1 MG/G
1 OINTMENT TOPICAL 2 TIMES DAILY
Qty: 60 G | Refills: 3 | Status: SHIPPED | OUTPATIENT
Start: 2024-06-18

## 2024-06-18 NOTE — PROGRESS NOTES
HPI:    Patient ID: Stephanie Belle is a 33 year old female.    Patient presents for concern of vitiligo. Patient has noticed hypopigmented spots above the upper lip, on the right inner wrist and the right thigh. Spots first appeared a year ago. Patient denies treatment in the past. No draining or tenderness noted. No new products. Aunt my have had vitiligo. No allergies to medications noted.         Review of Systems   Constitutional:  Negative for chills and fever.   Musculoskeletal:  Negative for arthralgias and myalgias.   Skin:  Positive for rash. Negative for color change and wound.            Current Outpatient Medications   Medication Sig Dispense Refill    tacrolimus (PROTOPIC) 0.1 % External Ointment Apply 1 Application topically 2 (two) times daily. 60 g 3    triamcinolone 0.025 % External Cream Apply 1 Application topically 2 (two) times daily. 454 g 0    traMADol 50 MG Oral Tab Take 1 tablet (50 mg total) by mouth every 6 (six) hours as needed for Pain. (Patient not taking: Reported on 4/24/2023) 15 tablet 0    naproxen 500 MG Oral Tab Take 1 tablet (500 mg total) by mouth 2 (two) times daily with meals. (Patient not taking: Reported on 4/21/2023) 60 tablet 0     Allergies:No Known Allergies   LMP 05/28/2024   There is no height or weight on file to calculate BMI.  PHYSICAL EXAM:   Physical Exam  Constitutional:       General: She is not in acute distress.     Appearance: Normal appearance.   Skin:     General: Skin is warm and dry.      Findings: Rash present.      Comments: Hypopigmented areas noted on the face and wrists. No draining or tenderness noted. No scaling noted. No excoriations noted.    Neurological:      Mental Status: She is alert and oriented to person, place, and time.                ASSESSMENT/PLAN:   1. Vitiligo  -After discussion with patient, advised the following:  -Started triamcinolone  -Educated to apply 2 times per day   -Start protopic  -Educated to apply 2 times per day    -Encouraged sun exposure with sun screen  -Return in 1 month  -To call or follow-up with worsening symptoms or concerns.   -Pt was agreeable to plan and will comply with discussion above.         No orders of the defined types were placed in this encounter.      Meds This Visit:  Requested Prescriptions     Signed Prescriptions Disp Refills    tacrolimus (PROTOPIC) 0.1 % External Ointment 60 g 3     Sig: Apply 1 Application topically 2 (two) times daily.    triamcinolone 0.025 % External Cream 454 g 0     Sig: Apply 1 Application topically 2 (two) times daily.       Imaging & Referrals:  None         ID#[id number]

## 2024-07-30 ENCOUNTER — OFFICE VISIT (OUTPATIENT)
Dept: DERMATOLOGY CLINIC | Facility: CLINIC | Age: 34
End: 2024-07-30

## 2024-07-30 DIAGNOSIS — L80 VITILIGO: Primary | ICD-10-CM

## 2024-07-30 PROCEDURE — 99213 OFFICE O/P EST LOW 20 MIN: CPT | Performed by: PHYSICIAN ASSISTANT

## 2024-07-30 NOTE — PROGRESS NOTES
HPI:    Patient ID: Stephanie Belle is a 33 year old female.    Patient presents with vitiligo follow-up. Hypopigmented spots on upper lip have gotten much better/spots on right lower inner forearm are slowly receding. No draining or tenderness noted. Using tacrolimus and triamcionlone. No draining or tenderness noted. No allergies to medications noted.     Review of Systems   Constitutional:  Negative for chills and fever.   Musculoskeletal:  Negative for arthralgias and myalgias.   Skin:  Positive for rash. Negative for color change and wound.            Current Outpatient Medications   Medication Sig Dispense Refill   • tacrolimus (PROTOPIC) 0.1 % External Ointment Apply 1 Application topically 2 (two) times daily. 60 g 3   • triamcinolone 0.025 % External Cream Apply 1 Application topically 2 (two) times daily. 454 g 0   • traMADol 50 MG Oral Tab Take 1 tablet (50 mg total) by mouth every 6 (six) hours as needed for Pain. (Patient not taking: Reported on 4/24/2023) 15 tablet 0   • naproxen 500 MG Oral Tab Take 1 tablet (500 mg total) by mouth 2 (two) times daily with meals. (Patient not taking: Reported on 4/21/2023) 60 tablet 0     Allergies:No Known Allergies   LMP 05/28/2024   There is no height or weight on file to calculate BMI.  PHYSICAL EXAM:   Physical Exam  Constitutional:       General: She is not in acute distress.     Appearance: Normal appearance.   Skin:     General: Skin is warm and dry.      Findings: Rash present.      Comments: Hypopigmented areas on the lips are resolved. No draining or tenderness noted. No scalnig noted. No erythema noted. Still on the right wrist however.    Neurological:      Mental Status: She is alert and oriented to person, place, and time.              ASSESSMENT/PLAN:   1. Vitiligo  -After discussion with patient, advised the following:  -Continue with protopic  -Educated to apply 2 times per day   -Continue with triamcionlone  -Can apply to other areas if they appear.    -To call or follow-up with worsening symptoms or concerns.   -Pt was agreeable to plan and will comply with discussion above.         No orders of the defined types were placed in this encounter.      Meds This Visit:  Requested Prescriptions      No prescriptions requested or ordered in this encounter       Imaging & Referrals:  None         ID#[id number]

## 2024-08-11 ENCOUNTER — APPOINTMENT (OUTPATIENT)
Dept: CT IMAGING | Facility: HOSPITAL | Age: 34
End: 2024-08-11
Attending: EMERGENCY MEDICINE
Payer: COMMERCIAL

## 2024-08-11 ENCOUNTER — ANESTHESIA EVENT (OUTPATIENT)
Dept: SURGERY | Facility: HOSPITAL | Age: 34
End: 2024-08-11
Payer: COMMERCIAL

## 2024-08-11 ENCOUNTER — ANESTHESIA (OUTPATIENT)
Dept: SURGERY | Facility: HOSPITAL | Age: 34
End: 2024-08-11
Payer: COMMERCIAL

## 2024-08-11 ENCOUNTER — HOSPITAL ENCOUNTER (OUTPATIENT)
Facility: HOSPITAL | Age: 34
Setting detail: OBSERVATION
Discharge: HOME | End: 2024-08-12
Attending: EMERGENCY MEDICINE | Admitting: HOSPITALIST
Payer: COMMERCIAL

## 2024-08-11 ENCOUNTER — OFFICE VISIT (OUTPATIENT)
Dept: FAMILY MEDICINE CLINIC | Facility: CLINIC | Age: 34
End: 2024-08-11
Payer: COMMERCIAL

## 2024-08-11 ENCOUNTER — APPOINTMENT (OUTPATIENT)
Dept: ULTRASOUND IMAGING | Facility: HOSPITAL | Age: 34
End: 2024-08-11
Attending: EMERGENCY MEDICINE
Payer: COMMERCIAL

## 2024-08-11 VITALS
HEIGHT: 67 IN | DIASTOLIC BLOOD PRESSURE: 70 MMHG | RESPIRATION RATE: 16 BRPM | WEIGHT: 189 LBS | HEART RATE: 102 BPM | TEMPERATURE: 98 F | BODY MASS INDEX: 29.66 KG/M2 | OXYGEN SATURATION: 99 % | SYSTOLIC BLOOD PRESSURE: 114 MMHG

## 2024-08-11 DIAGNOSIS — K35.80 ACUTE APPENDICITIS: ICD-10-CM

## 2024-08-11 DIAGNOSIS — R10.31 ACUTE RIGHT LOWER QUADRANT PAIN: Primary | ICD-10-CM

## 2024-08-11 DIAGNOSIS — K35.80 ACUTE APPENDICITIS, UNSPECIFIED ACUTE APPENDICITIS TYPE: Primary | ICD-10-CM

## 2024-08-11 PROBLEM — D72.829 LEUKOCYTOSIS: Status: ACTIVE | Noted: 2024-08-11

## 2024-08-11 PROBLEM — R73.9 HYPERGLYCEMIA: Status: ACTIVE | Noted: 2024-08-11

## 2024-08-11 LAB
ALBUMIN SERPL-MCNC: 4.9 G/DL (ref 3.2–4.8)
ALP LIVER SERPL-CCNC: 86 U/L
ALT SERPL-CCNC: 15 U/L
ANION GAP SERPL CALC-SCNC: 7 MMOL/L (ref 0–18)
AST SERPL-CCNC: 12 U/L (ref ?–34)
B-HCG UR QL: NEGATIVE
BASOPHILS # BLD AUTO: 0.07 X10(3) UL (ref 0–0.2)
BASOPHILS NFR BLD AUTO: 0.4 %
BILIRUB DIRECT SERPL-MCNC: 0.2 MG/DL (ref ?–0.3)
BILIRUB SERPL-MCNC: 0.9 MG/DL (ref 0.3–1.2)
BILIRUB UR QL: NEGATIVE
BUN BLD-MCNC: 8 MG/DL (ref 9–23)
BUN/CREAT SERPL: 11.6 (ref 10–20)
CALCIUM BLD-MCNC: 9.6 MG/DL (ref 8.7–10.4)
CHLORIDE SERPL-SCNC: 105 MMOL/L (ref 98–112)
CO2 SERPL-SCNC: 26 MMOL/L (ref 21–32)
COLOR UR: YELLOW
CREAT BLD-MCNC: 0.69 MG/DL
DEPRECATED RDW RBC AUTO: 39.8 FL (ref 35.1–46.3)
EGFRCR SERPLBLD CKD-EPI 2021: 118 ML/MIN/1.73M2 (ref 60–?)
EOSINOPHIL # BLD AUTO: 0.06 X10(3) UL (ref 0–0.7)
EOSINOPHIL NFR BLD AUTO: 0.3 %
ERYTHROCYTE [DISTWIDTH] IN BLOOD BY AUTOMATED COUNT: 13.3 % (ref 11–15)
GLUCOSE BLD-MCNC: 100 MG/DL (ref 70–99)
GLUCOSE UR-MCNC: NORMAL MG/DL
HCT VFR BLD AUTO: 40.9 %
HCV AB SERPL QL IA: NONREACTIVE
HGB BLD-MCNC: 14.2 G/DL
HIV 1+2 AB+HIV1 P24 AG SERPL QL IA: NONREACTIVE
IMM GRANULOCYTES # BLD AUTO: 0.11 X10(3) UL (ref 0–1)
IMM GRANULOCYTES NFR BLD: 0.6 %
KETONES UR-MCNC: NEGATIVE MG/DL
LEUKOCYTE ESTERASE UR QL STRIP.AUTO: 500
LIPASE SERPL-CCNC: 30 U/L (ref 12–53)
LYMPHOCYTES # BLD AUTO: 2.69 X10(3) UL (ref 1–4)
LYMPHOCYTES NFR BLD AUTO: 14.2 %
MCH RBC QN AUTO: 28.5 PG (ref 26–34)
MCHC RBC AUTO-ENTMCNC: 34.7 G/DL (ref 31–37)
MCV RBC AUTO: 82.1 FL
MONOCYTES # BLD AUTO: 1.31 X10(3) UL (ref 0.1–1)
MONOCYTES NFR BLD AUTO: 6.9 %
NEUTROPHILS # BLD AUTO: 14.69 X10 (3) UL (ref 1.5–7.7)
NEUTROPHILS # BLD AUTO: 14.69 X10(3) UL (ref 1.5–7.7)
NEUTROPHILS NFR BLD AUTO: 77.6 %
NITRITE UR QL STRIP.AUTO: NEGATIVE
OSMOLALITY SERPL CALC.SUM OF ELEC: 284 MOSM/KG (ref 275–295)
PH UR: 6.5 [PH] (ref 5–8)
PLATELET # BLD AUTO: 366 10(3)UL (ref 150–450)
POTASSIUM SERPL-SCNC: 3.7 MMOL/L (ref 3.5–5.1)
PROT SERPL-MCNC: 8.3 G/DL (ref 5.7–8.2)
PROT UR-MCNC: 50 MG/DL
RBC # BLD AUTO: 4.98 X10(6)UL
RBC #/AREA URNS AUTO: >10 /HPF
SODIUM SERPL-SCNC: 138 MMOL/L (ref 136–145)
SP GR UR STRIP: 1.02 (ref 1–1.03)
UROBILINOGEN UR STRIP-ACNC: NORMAL
WBC # BLD AUTO: 18.9 X10(3) UL (ref 4–11)
WBC #/AREA URNS AUTO: >50 /HPF

## 2024-08-11 PROCEDURE — 3074F SYST BP LT 130 MM HG: CPT | Performed by: PHYSICIAN ASSISTANT

## 2024-08-11 PROCEDURE — 93975 VASCULAR STUDY: CPT | Performed by: EMERGENCY MEDICINE

## 2024-08-11 PROCEDURE — 99244 OFF/OP CNSLTJ NEW/EST MOD 40: CPT | Performed by: SURGERY

## 2024-08-11 PROCEDURE — 74177 CT ABD & PELVIS W/CONTRAST: CPT | Performed by: EMERGENCY MEDICINE

## 2024-08-11 PROCEDURE — 0DTJ4ZZ RESECTION OF APPENDIX, PERCUTANEOUS ENDOSCOPIC APPROACH: ICD-10-PCS | Performed by: SURGERY

## 2024-08-11 PROCEDURE — 44970 LAPAROSCOPY APPENDECTOMY: CPT | Performed by: SURGERY

## 2024-08-11 PROCEDURE — 76830 TRANSVAGINAL US NON-OB: CPT | Performed by: EMERGENCY MEDICINE

## 2024-08-11 PROCEDURE — 3008F BODY MASS INDEX DOCD: CPT | Performed by: PHYSICIAN ASSISTANT

## 2024-08-11 PROCEDURE — 99215 OFFICE O/P EST HI 40 MIN: CPT | Performed by: PHYSICIAN ASSISTANT

## 2024-08-11 PROCEDURE — 76856 US EXAM PELVIC COMPLETE: CPT | Performed by: EMERGENCY MEDICINE

## 2024-08-11 PROCEDURE — 3078F DIAST BP <80 MM HG: CPT | Performed by: PHYSICIAN ASSISTANT

## 2024-08-11 PROCEDURE — 99223 1ST HOSP IP/OBS HIGH 75: CPT | Performed by: INTERNAL MEDICINE

## 2024-08-11 RX ORDER — MORPHINE SULFATE 4 MG/ML
2 INJECTION, SOLUTION INTRAMUSCULAR; INTRAVENOUS EVERY 10 MIN PRN
Status: DISCONTINUED | OUTPATIENT
Start: 2024-08-11 | End: 2024-08-11 | Stop reason: HOSPADM

## 2024-08-11 RX ORDER — MORPHINE SULFATE 2 MG/ML
2 INJECTION, SOLUTION INTRAMUSCULAR; INTRAVENOUS EVERY 2 HOUR PRN
Status: DISCONTINUED | OUTPATIENT
Start: 2024-08-11 | End: 2024-08-12

## 2024-08-11 RX ORDER — HYDROMORPHONE HYDROCHLORIDE 1 MG/ML
0.4 INJECTION, SOLUTION INTRAMUSCULAR; INTRAVENOUS; SUBCUTANEOUS EVERY 5 MIN PRN
Status: DISCONTINUED | OUTPATIENT
Start: 2024-08-11 | End: 2024-08-11 | Stop reason: HOSPADM

## 2024-08-11 RX ORDER — HYDROMORPHONE HYDROCHLORIDE 1 MG/ML
0.6 INJECTION, SOLUTION INTRAMUSCULAR; INTRAVENOUS; SUBCUTANEOUS EVERY 5 MIN PRN
Status: DISCONTINUED | OUTPATIENT
Start: 2024-08-11 | End: 2024-08-11 | Stop reason: HOSPADM

## 2024-08-11 RX ORDER — SODIUM CHLORIDE, SODIUM LACTATE, POTASSIUM CHLORIDE, CALCIUM CHLORIDE 600; 310; 30; 20 MG/100ML; MG/100ML; MG/100ML; MG/100ML
INJECTION, SOLUTION INTRAVENOUS CONTINUOUS
Status: DISCONTINUED | OUTPATIENT
Start: 2024-08-11 | End: 2024-08-11 | Stop reason: HOSPADM

## 2024-08-11 RX ORDER — LIDOCAINE HYDROCHLORIDE 10 MG/ML
INJECTION, SOLUTION EPIDURAL; INFILTRATION; INTRACAUDAL; PERINEURAL AS NEEDED
Status: DISCONTINUED | OUTPATIENT
Start: 2024-08-11 | End: 2024-08-11 | Stop reason: SURG

## 2024-08-11 RX ORDER — ACETAMINOPHEN 500 MG
500 TABLET ORAL EVERY 4 HOURS PRN
Status: DISCONTINUED | OUTPATIENT
Start: 2024-08-11 | End: 2024-08-12

## 2024-08-11 RX ORDER — MIDAZOLAM HYDROCHLORIDE 1 MG/ML
INJECTION INTRAMUSCULAR; INTRAVENOUS AS NEEDED
Status: DISCONTINUED | OUTPATIENT
Start: 2024-08-11 | End: 2024-08-11 | Stop reason: SURG

## 2024-08-11 RX ORDER — ROCURONIUM BROMIDE 10 MG/ML
INJECTION, SOLUTION INTRAVENOUS AS NEEDED
Status: DISCONTINUED | OUTPATIENT
Start: 2024-08-11 | End: 2024-08-11 | Stop reason: SURG

## 2024-08-11 RX ORDER — HYDROCODONE BITARTRATE AND ACETAMINOPHEN 5; 325 MG/1; MG/1
1 TABLET ORAL EVERY 4 HOURS PRN
Status: DISCONTINUED | OUTPATIENT
Start: 2024-08-11 | End: 2024-08-12

## 2024-08-11 RX ORDER — ONDANSETRON 2 MG/ML
INJECTION INTRAMUSCULAR; INTRAVENOUS AS NEEDED
Status: DISCONTINUED | OUTPATIENT
Start: 2024-08-11 | End: 2024-08-11 | Stop reason: SURG

## 2024-08-11 RX ORDER — ACETAMINOPHEN 10 MG/ML
1000 INJECTION, SOLUTION INTRAVENOUS ONCE
Status: COMPLETED | OUTPATIENT
Start: 2024-08-11 | End: 2024-08-12

## 2024-08-11 RX ORDER — ONDANSETRON 2 MG/ML
4 INJECTION INTRAMUSCULAR; INTRAVENOUS EVERY 6 HOURS PRN
Status: DISCONTINUED | OUTPATIENT
Start: 2024-08-11 | End: 2024-08-11 | Stop reason: HOSPADM

## 2024-08-11 RX ORDER — ACETAMINOPHEN 325 MG/1
650 TABLET ORAL EVERY 4 HOURS PRN
Status: DISCONTINUED | OUTPATIENT
Start: 2024-08-11 | End: 2024-08-12

## 2024-08-11 RX ORDER — SODIUM CHLORIDE, SODIUM LACTATE, POTASSIUM CHLORIDE, CALCIUM CHLORIDE 600; 310; 30; 20 MG/100ML; MG/100ML; MG/100ML; MG/100ML
INJECTION, SOLUTION INTRAVENOUS CONTINUOUS PRN
Status: DISCONTINUED | OUTPATIENT
Start: 2024-08-11 | End: 2024-08-11 | Stop reason: SURG

## 2024-08-11 RX ORDER — MORPHINE SULFATE 10 MG/ML
6 INJECTION, SOLUTION INTRAMUSCULAR; INTRAVENOUS EVERY 10 MIN PRN
Status: DISCONTINUED | OUTPATIENT
Start: 2024-08-11 | End: 2024-08-11 | Stop reason: HOSPADM

## 2024-08-11 RX ORDER — ONDANSETRON 2 MG/ML
4 INJECTION INTRAMUSCULAR; INTRAVENOUS EVERY 6 HOURS PRN
Status: DISCONTINUED | OUTPATIENT
Start: 2024-08-11 | End: 2024-08-12

## 2024-08-11 RX ORDER — MORPHINE SULFATE 4 MG/ML
4 INJECTION, SOLUTION INTRAMUSCULAR; INTRAVENOUS EVERY 2 HOUR PRN
Status: DISCONTINUED | OUTPATIENT
Start: 2024-08-11 | End: 2024-08-12

## 2024-08-11 RX ORDER — PROCHLORPERAZINE EDISYLATE 5 MG/ML
5 INJECTION INTRAMUSCULAR; INTRAVENOUS EVERY 8 HOURS PRN
Status: DISCONTINUED | OUTPATIENT
Start: 2024-08-11 | End: 2024-08-11 | Stop reason: HOSPADM

## 2024-08-11 RX ORDER — SODIUM CHLORIDE 9 MG/ML
INJECTION, SOLUTION INTRAVENOUS CONTINUOUS
Status: DISCONTINUED | OUTPATIENT
Start: 2024-08-11 | End: 2024-08-12

## 2024-08-11 RX ORDER — KETOROLAC TROMETHAMINE 30 MG/ML
INJECTION, SOLUTION INTRAMUSCULAR; INTRAVENOUS AS NEEDED
Status: DISCONTINUED | OUTPATIENT
Start: 2024-08-11 | End: 2024-08-11 | Stop reason: SURG

## 2024-08-11 RX ORDER — HYDROCODONE BITARTRATE AND ACETAMINOPHEN 5; 325 MG/1; MG/1
1 TABLET ORAL EVERY 6 HOURS PRN
Status: DISCONTINUED | OUTPATIENT
Start: 2024-08-11 | End: 2024-08-12

## 2024-08-11 RX ORDER — METRONIDAZOLE 500 MG/100ML
500 INJECTION, SOLUTION INTRAVENOUS EVERY 12 HOURS
Status: DISCONTINUED | OUTPATIENT
Start: 2024-08-12 | End: 2024-08-12

## 2024-08-11 RX ORDER — HEPARIN SODIUM 5000 [USP'U]/ML
5000 INJECTION, SOLUTION INTRAVENOUS; SUBCUTANEOUS EVERY 8 HOURS SCHEDULED
Status: DISCONTINUED | OUTPATIENT
Start: 2024-08-11 | End: 2024-08-12

## 2024-08-11 RX ORDER — BUPIVACAINE HYDROCHLORIDE AND EPINEPHRINE 5; 5 MG/ML; UG/ML
INJECTION, SOLUTION PERINEURAL AS NEEDED
Status: DISCONTINUED | OUTPATIENT
Start: 2024-08-11 | End: 2024-08-11 | Stop reason: HOSPADM

## 2024-08-11 RX ORDER — HYDROCODONE BITARTRATE AND ACETAMINOPHEN 5; 325 MG/1; MG/1
2 TABLET ORAL EVERY 4 HOURS PRN
Status: DISCONTINUED | OUTPATIENT
Start: 2024-08-11 | End: 2024-08-12

## 2024-08-11 RX ORDER — MORPHINE SULFATE 2 MG/ML
1 INJECTION, SOLUTION INTRAMUSCULAR; INTRAVENOUS EVERY 2 HOUR PRN
Status: DISCONTINUED | OUTPATIENT
Start: 2024-08-11 | End: 2024-08-12

## 2024-08-11 RX ORDER — METRONIDAZOLE 500 MG/100ML
500 INJECTION, SOLUTION INTRAVENOUS EVERY 8 HOURS
Status: DISCONTINUED | OUTPATIENT
Start: 2024-08-11 | End: 2024-08-11

## 2024-08-11 RX ORDER — DEXAMETHASONE SODIUM PHOSPHATE 4 MG/ML
VIAL (ML) INJECTION AS NEEDED
Status: DISCONTINUED | OUTPATIENT
Start: 2024-08-11 | End: 2024-08-11 | Stop reason: SURG

## 2024-08-11 RX ORDER — METRONIDAZOLE 500 MG/100ML
500 INJECTION, SOLUTION INTRAVENOUS ONCE
Status: DISCONTINUED | OUTPATIENT
Start: 2024-08-11 | End: 2024-08-11

## 2024-08-11 RX ORDER — METRONIDAZOLE 500 MG/100ML
500 INJECTION, SOLUTION INTRAVENOUS ONCE
Status: COMPLETED | OUTPATIENT
Start: 2024-08-11 | End: 2024-08-12

## 2024-08-11 RX ORDER — MORPHINE SULFATE 4 MG/ML
4 INJECTION, SOLUTION INTRAMUSCULAR; INTRAVENOUS EVERY 10 MIN PRN
Status: DISCONTINUED | OUTPATIENT
Start: 2024-08-11 | End: 2024-08-11 | Stop reason: HOSPADM

## 2024-08-11 RX ORDER — NALOXONE HYDROCHLORIDE 0.4 MG/ML
0.08 INJECTION, SOLUTION INTRAMUSCULAR; INTRAVENOUS; SUBCUTANEOUS AS NEEDED
Status: DISCONTINUED | OUTPATIENT
Start: 2024-08-11 | End: 2024-08-11 | Stop reason: HOSPADM

## 2024-08-11 RX ORDER — HYDROMORPHONE HYDROCHLORIDE 1 MG/ML
0.2 INJECTION, SOLUTION INTRAMUSCULAR; INTRAVENOUS; SUBCUTANEOUS EVERY 5 MIN PRN
Status: DISCONTINUED | OUTPATIENT
Start: 2024-08-11 | End: 2024-08-11 | Stop reason: HOSPADM

## 2024-08-11 RX ADMIN — MIDAZOLAM HYDROCHLORIDE 2 MG: 1 INJECTION INTRAMUSCULAR; INTRAVENOUS at 17:20:00

## 2024-08-11 RX ADMIN — ROCURONIUM BROMIDE 20 MG: 10 INJECTION, SOLUTION INTRAVENOUS at 18:34:00

## 2024-08-11 RX ADMIN — SODIUM CHLORIDE, SODIUM LACTATE, POTASSIUM CHLORIDE, CALCIUM CHLORIDE: 600; 310; 30; 20 INJECTION, SOLUTION INTRAVENOUS at 17:19:00

## 2024-08-11 RX ADMIN — ROCURONIUM BROMIDE 10 MG: 10 INJECTION, SOLUTION INTRAVENOUS at 17:22:00

## 2024-08-11 RX ADMIN — SODIUM CHLORIDE, SODIUM LACTATE, POTASSIUM CHLORIDE, CALCIUM CHLORIDE: 600; 310; 30; 20 INJECTION, SOLUTION INTRAVENOUS at 18:33:00

## 2024-08-11 RX ADMIN — DEXAMETHASONE SODIUM PHOSPHATE 8 MG: 4 MG/ML VIAL (ML) INJECTION at 17:32:00

## 2024-08-11 RX ADMIN — LIDOCAINE HYDROCHLORIDE 50 MG: 10 INJECTION, SOLUTION EPIDURAL; INFILTRATION; INTRACAUDAL; PERINEURAL at 17:22:00

## 2024-08-11 RX ADMIN — ONDANSETRON 4 MG: 2 INJECTION INTRAMUSCULAR; INTRAVENOUS at 17:32:00

## 2024-08-11 RX ADMIN — ROCURONIUM BROMIDE 40 MG: 10 INJECTION, SOLUTION INTRAVENOUS at 17:30:00

## 2024-08-11 RX ADMIN — KETOROLAC TROMETHAMINE 30 MG: 30 INJECTION, SOLUTION INTRAMUSCULAR; INTRAVENOUS at 19:11:00

## 2024-08-11 NOTE — ANESTHESIA PREPROCEDURE EVALUATION
Anesthesia PreOp Note    HPI:     Stephanie Belle is a 33 year old female who presents for preoperative consultation requested by: Lorena Rios MD    Date of Surgery: 8/11/2024    Procedure(s):  LAPAROSCOPIC POSSIBLE OPEN APPENDECTOMY  Indication: Acute appendicitis [K35.80]    Relevant Problems   No relevant active problems       NPO:  Last Liquid Consumption Date: 08/11/24  Last Liquid Consumption Time: 0900  Last Solid Consumption Date: 08/10/24  Last Solid Consumption Time: 1700  Last Liquid Consumption Date: 08/11/24          History Review:  Patient Active Problem List    Diagnosis Date Noted    Leukocytosis 08/11/2024    Hyperglycemia 08/11/2024    Acute appendicitis, unspecified acute appendicitis type 08/11/2024       Past Medical History:    Ovarian cyst       History reviewed. No pertinent surgical history.    Medications Prior to Admission   Medication Sig Dispense Refill Last Dose    tacrolimus (PROTOPIC) 0.1 % External Ointment Apply 1 Application topically 2 (two) times daily. 60 g 3 8/9/2024     No current [facility]-ordered facility-administered medications on file.     No current [facility]-ordered outpatient medications on file.       Not on File    Family History   Problem Relation Age of Onset    Lipids Father      Social History     Socioeconomic History    Marital status:    Tobacco Use    Smoking status: Never    Smokeless tobacco: Never   Vaping Use    Vaping status: Never Used   Substance and Sexual Activity    Alcohol use: Yes     Comment: social    Drug use: No   Other Topics Concern    Breast feeding No    Reaction to local anesthetic No    Pt has a pacemaker No    Pt has a defibrillator No       Available pre-op labs reviewed.  Lab Results   Component Value Date    WBC 18.9 (H) 08/11/2024    RBC 4.98 08/11/2024    HGB 14.2 08/11/2024    HCT 40.9 08/11/2024    MCV 82.1 08/11/2024    MCH 28.5 08/11/2024    MCHC 34.7 08/11/2024    RDW 13.3 08/11/2024    .0 08/11/2024    URINEPREG  Negative 08/11/2024     Lab Results   Component Value Date     08/11/2024    K 3.7 08/11/2024     08/11/2024    CO2 26.0 08/11/2024    BUN 8 (L) 08/11/2024    CREATSERUM 0.69 08/11/2024     (H) 08/11/2024    CA 9.6 08/11/2024          Vital Signs:  Body mass index is 29.24 kg/m².   height is 1.702 m (5' 7\") and weight is 84.7 kg (186 lb 11.2 oz). Her temporal temperature is 96.7 °F (35.9 °C). Her blood pressure is 105/81 and her pulse is 83. Her respiration is 16 and oxygen saturation is 98%.   Vitals:    08/11/24 1315 08/11/24 1430 08/11/24 1515 08/11/24 1559   BP: 116/75 120/59 105/81    Pulse: 79 83 83    Resp: 16 16     Temp:       TempSrc:       SpO2: 99% 96% 98%    Weight:    84.7 kg (186 lb 11.2 oz)   Height:            Anesthesia Evaluation      No history of anesthetic complications   Airway   Mallampati: II  TM distance: >3 FB  Neck ROM: full  Dental - Dentition appears grossly intact     Pulmonary - negative ROS and normal exam   (-) asthma, shortness of breath, recent URI  Cardiovascular - negative ROS  Exercise tolerance: good  (-) hypertension, dysrhythmias    Rhythm: regular  Rate: normal    Neuro/Psych - negative ROS   (-) seizures, neuromuscular disease    GI/Hepatic/Renal - negative ROS   (-) hepatitis, liver disease, renal disease    Endo/Other - negative ROS   (-) diabetes mellitus, blood dyscrasia  Abdominal  - normal exam                 Anesthesia Plan:   ASA:  2  Emergent    Plan:   General  Airway:  ETT  Post-op Pain Management: IV analgesics  Informed Consent Plan and Risks Discussed With:  Patient  Discussed plan with:  Surgeon      I have informed Stephanie Belle and/or legal guardian or family member of the nature of the anesthetic plan, benefits, risks including possible dental damage if relevant, major complications, and any alternative forms of anesthetic management.   All of the patient's questions were answered to the best of my ability. The patient desires the  anesthetic management as planned.  Lucia Cortez DO  8/11/2024 4:42 PM  Present on Admission:   Leukocytosis   Hyperglycemia

## 2024-08-11 NOTE — ED INITIAL ASSESSMENT (HPI)
Pt c/o RLQ pain. Pt sent from . PT denies gu s/s. Pt reports currently on period. Pt reports hx of ovarian cysts.

## 2024-08-11 NOTE — ED QUICK NOTES
Orders for admission, patient is aware of plan and ready to go upstairs. Any questions, please call ED CRISSY Coon  at extension [phone].   Type of COVID test sent:None  COVID Suspicion level: Low    Titratable drug(s) infusing:NS bolus and Rocephin @ 200ml/hr. Needs Flagyl still.      LOC at time of transport:A&Ox4    Other pertinent information: Plan for OR for [facility].

## 2024-08-11 NOTE — ANESTHESIA PROCEDURE NOTES
Airway  Date/Time: 8/11/2024 5:24 PM  Urgency: Elective    Airway not difficult    General Information and Staff    Patient location during procedure: OR  Anesthesiologist: Lucia Cortez DO  Performed: anesthesiologist   Performed by: Lucia Cortez DO  Authorized by: Lucia Cortez DO      Indications and Patient Condition  Indications for airway management: anesthesia  Spontaneous Ventilation: absent  Sedation level: deep  Preoxygenated: yes  Patient position: sniffing  Mask difficulty assessment: 1 - vent by mask    Final Airway Details  Final airway type: endotracheal airway      Successful airway: ETT  Cuffed: yes   Successful intubation technique: direct laryngoscopy  Facilitating devices/methods: intubating stylet  Endotracheal tube insertion site: oral  Blade: Marc  Blade size: #3  ETT size (mm): 7.0    Cormack-Lehane Classification: grade IIA - partial view of glottis  Placement verified by: capnometry   Measured from: teeth  ETT to teeth (cm): 20  Number of attempts at approach: 1  Number of other approaches attempted: 0

## 2024-08-11 NOTE — ED PROVIDER NOTES
[facility]  Emergency Department Attending Note     Chief Complaint:   Chief Complaint   Patient presents with    Abdominal Pain     HISTORY OF PRESENT ILLNESS:   Stephanie Belle is a 33 year old female who presents to the ED medical history including ovarian cysts presents for complaint of right lower quadrant abdominal pain worse over the last 2 days.  Some decreased appetite.  Pain is cramping, different from her usual pain with her cysts.  Denies any nausea vomiting diarrhea.  Denies any dysuria hematuria urgency frequency.  Denies any pelvic pain.  No fever chills     MEDICAL & SOCIAL HISTORY:   Past Medical History:    Ovarian cyst    No past surgical history on file.   Social History     Socioeconomic History    Marital status:    Tobacco Use    Smoking status: Never    Smokeless tobacco: Never   Vaping Use    Vaping status: Never Used   Substance and Sexual Activity    Alcohol use: Yes     Comment: social    Drug use: No   Other Topics Concern    Breast feeding No    Reaction to local anesthetic No    Pt has a pacemaker No    Pt has a defibrillator No    No Known Allergies   Current Outpatient Medications   Medication Sig Dispense Refill    tacrolimus (PROTOPIC) 0.1 % External Ointment Apply 1 Application topically 2 (two) times daily. 60 g 3    triamcinolone 0.025 % External Cream Apply 1 Application topically 2 (two) times daily. 454 g 0          REVIEW OF SYSTEMS   A 10 point review of systems was completed and is negative except as listed in history of present illness      PHYSICAL EXAM   Vitals: /75   Pulse 79   Temp 96.7 °F (35.9 °C) (Temporal)   Resp 16   Ht 170.2 cm (5' 7\")   Wt 85.7 kg   LMP 08/07/2024   SpO2 99%   BMI 29.60 kg/m²   /75   Pulse 79   Temp 96.7 °F (35.9 °C) (Temporal)   Resp 16   Ht 170.2 cm (5' 7\")   Wt 85.7 kg   LMP 08/07/2024   SpO2 99%   BMI 29.60 kg/m²     General: A&Ox3, NAD  Constitutional: Well developed, well nourished, nontoxic  Head:  atraumatic, normocephalic   Eyes: conjuctiva clear, no icterus, PERRL, EOMI, vision grossly normal  Ears: normal external appearance, no drainage  Nose:  Atraumatic, no swelling, no drainage, nares patent  Throat:  Moist pink mucous membranes, airway is patent  Neck:  Soft supple, no masses, no tracheal deviation, no stridor  Chest:  No bruising or abrasions, no tenderness, no deformity  Cardiac:  Regular rate and rhythm, no murmurs rubs or gallops.  Lung:  No distress, no retractions. Clear to auscultation bilaterally, no w/r/r  Abdomen:  Soft, tender to palpation to the right lower quadrant without any rebound or guarding or rigidity or pulsatile mass negative not sonographic Grossman sign, nondistended, normal BS  Back: No stepoff/deformity  Extremities: FROM all ext, no deformities, intact equal peripheral pulses, no cyanosis or edema  Neuro: No facial droop, no slurred speech, moving all extremities freely, SILT to the bilateral upper and lower extremities  Psych: A&Ox3, normal affect, cooperative, calm  Skin: No rash, no petechiae/purpura, warm, dry      RESULTS  LABS:   Results for orders placed or performed during the hospital encounter of 08/11/24   Urinalysis, Routine   Result Value Ref Range    Urine Color Yellow Yellow    Clarity Urine Turbid (A) Clear    Spec Gravity 1.019 1.005 - 1.030    Glucose Urine Normal Normal mg/dL    Bilirubin Urine Negative Negative    Ketones Urine Negative Negative mg/dL    Blood Urine 3+ (A) Negative    pH Urine 6.5 5.0 - 8.0    Protein Urine 50 (A) Negative mg/dL    Urobilinogen Urine Normal Normal    Nitrite Urine Negative Negative    Leukocyte Esterase Urine 500 (A) Negative    WBC Urine >50 (A) 0 - 5 /HPF    RBC Urine >10 (A) 0 - 2 /HPF    Bacteria Urine Rare (A) None Seen /HPF    Squamous Epi. Cells Few (A) None Seen /HPF    Renal Tubular Epithelial Cells None Seen None Seen /HPF    Transitional Cells None Seen None Seen /HPF    Yeast Urine None Seen None Seen /HPF   CBC  With Differential With Platelet   Result Value Ref Range    WBC 18.9 (H) 4.0 - 11.0 x10(3) uL    RBC 4.98 3.80 - 5.30 x10(6)uL    HGB 14.2 12.0 - 16.0 g/dL    HCT 40.9 35.0 - 48.0 %    MCV 82.1 80.0 - 100.0 fL    MCH 28.5 26.0 - 34.0 pg    MCHC 34.7 31.0 - 37.0 g/dL    RDW-SD 39.8 35.1 - 46.3 fL    RDW 13.3 11.0 - 15.0 %    .0 150.0 - 450.0 10(3)uL    Neutrophil Absolute Prelim 14.69 (H) 1.50 - 7.70 x10 (3) uL    Neutrophil Absolute 14.69 (H) 1.50 - 7.70 x10(3) uL    Lymphocyte Absolute 2.69 1.00 - 4.00 x10(3) uL    Monocyte Absolute 1.31 (H) 0.10 - 1.00 x10(3) uL    Eosinophil Absolute 0.06 0.00 - 0.70 x10(3) uL    Basophil Absolute 0.07 0.00 - 0.20 x10(3) uL    Immature Granulocyte Absolute 0.11 0.00 - 1.00 x10(3) uL    Neutrophil % 77.6 %    Lymphocyte % 14.2 %    Monocyte % 6.9 %    Eosinophil % 0.3 %    Basophil % 0.4 %    Immature Granulocyte % 0.6 %   Basic Metabolic Panel (8)   Result Value Ref Range    Glucose 100 (H) 70 - 99 mg/dL    Sodium 138 136 - 145 mmol/L    Potassium 3.7 3.5 - 5.1 mmol/L    Chloride 105 98 - 112 mmol/L    CO2 26.0 21.0 - 32.0 mmol/L    Anion Gap 7 0 - 18 mmol/L    BUN 8 (L) 9 - 23 mg/dL    Creatinine 0.69 0.55 - 1.02 mg/dL    BUN/CREA Ratio 11.6 10.0 - 20.0    Calcium, Total 9.6 8.7 - 10.4 mg/dL    Calculated Osmolality 284 275 - 295 mOsm/kg    eGFR-Cr 118 >=60 mL/min/1.73m2   Hepatic Function Panel (7)   Result Value Ref Range    AST 12 <34 U/L    ALT 15 10 - 49 U/L    Alkaline Phosphatase 86 37 - 98 U/L    Bilirubin, Total 0.9 0.3 - 1.2 mg/dL    Bilirubin, Direct 0.2 <=0.3 mg/dL    Total Protein 8.3 (H) 5.7 - 8.2 g/dL    Albumin 4.9 (H) 3.2 - 4.8 g/dL   Lipase   Result Value Ref Range    Lipase 30 12 - 53 U/L   POCT Pregnancy, Urine   Result Value Ref Range    POCT Urine Pregnancy Negative Negative   RAINBOW DRAW LAVENDER   Result Value Ref Range    Hold Lavender Auto Resulted    RAINBOW DRAW LIGHT GREEN   Result Value Ref Range    Hold Lt Green Auto Resulted           IMAGING: US PELVIS EV W DOPPLER (CPT=76856/37826/54061)    Result Date: 8/11/2024  CONCLUSION:  1. No evidence of ovarian torsion.  2. Complex probable hemorrhagic cysts of the right ovary are noted.  3. Intramural and subserosal uterine fibroids.  4. Small volume free pelvic fluid.   5. Lesser incidental findings as above.    Dictated by (CST): Daniel Watson MD on 8/11/2024 at 2:04 PM     Finalized by (CST): Daniel Watson MD on 8/11/2024 at 2:08 PM          CT ABDOMEN+PELVIS(CONTRAST ONLY)(CPT=74177)    Result Date: 8/11/2024  CONCLUSION:  1. Acute retrocecal appendicitis with extensive periappendiceal phlegmon, but no definite evidence of clear periappendiceal abscess formation.  2. Complex-appearing right ovarian cysts, most likely functional/physiologic in etiology.  3. Enhancing intramural/subserosal uterine fibroids.  4. Lesser incidental findings as above.    Results of this examination were discussed with the patient's physician, Dr. Batista, by Dr. Watson at 1400 on 08/11/2024.   Dictated by (CST): Daniel Watson MD on 8/11/2024 at 1:59 PM     Finalized by (CST): Daniel Watson MD on 8/11/2024 at 2:04 PM           I personally reviewed the radiology study and my finding were acute appendicitis      Procedures:   Procedures     ED COURSE          Re-Evaluation: Improved      Disposition & Plan:   Clinical Impression/Final Diagnosis:   1. Acute appendicitis, unspecified acute appendicitis type        Medical Decision Making: Exam history workup most consistent with acute appendicitis, discussed the case with the surgeon Dr. MARLOW who is on consult and agrees with treatment plan, will keep n.p.o. give IV fluid and start IV antibiotic    Medical Decision Making  Amount and/or Complexity of Data Reviewed  External Data Reviewed: notes.  Labs: ordered. Decision-making details documented in ED Course.  Radiology: ordered and independent interpretation performed. Decision-making details documented in ED  Course.    Risk  OTC drugs.  Prescription drug management.  Decision regarding hospitalization.        Disposition: Admit  There are no disposition comments on file for this visit.     This note was generated in part using voice recognition dictation technology. The report was reviewed by this physician but still may have unintentional errors due to inherent limitations of voice recognition technology. All times are estimates.

## 2024-08-11 NOTE — PROGRESS NOTES
Patient, Stephanie Belle , is a 33 year old female who presented today in clinic with RLQ pain that has been consistent since yesterday.  Feels bloated/gassy. Noted some abdominal distention/bloating yesterday.  Denies diarrhea.  Reports she is stooling normally.  No dysuria, frequency, urgency.  She denies any heavy lifting.  She does feel like stretching and standing straight up helps pain.        Vitals:    08/11/24 0917   BP: 114/70   Pulse: 102   Resp: 16   Temp: 98.3 °F (36.8 °C)   SpO2: 99%   Weight: 189 lb (85.7 kg)   Height: 5' 7\" (1.702 m)         Exam:    GENERAL: appears well nourished,  but uncomfortable looking.   HEAD: atraumatic, normocephalic.   EYES: conjunctiva clear  NECK: Supple, non-tender  LUNGS:  Breathing is non labored. Speaking in full sentences without hesitation  CARDIO: RRR without murmur  GI: active BS's x4,no masses, no palpable hepatosplenomegaly, moderate tenderness on direct palpation  in RLQ, + McBurney point.  Pt able to jump without pain.   NEURO: No focal deficits   Accompanied by: self  After triage and detailed discussion with patient/familiy, it was determined that patient would benefit from a higher acuity or more comprehensive level of care.  I informed patient of the scope of practice of the Walk-in Clinic and advised the be further evaluated at [facility] ED .  Patient/parent /family verbalized understanding of rationale for further evaluation and was stable upon departure from the Walk-in Clinic.

## 2024-08-11 NOTE — CONSULTS
[facility]  part of [facility]    Report of Consultation    Stephanie Belle Patient Status:  Emergency    1990 MRN [id number]   Location [facility] EMERGENCY DEPARTMENT Attending Callie Batista MD   Hosp Day # 0 PCP Jeannie Osullivan MD     Date of Admission:  2024  Date of Consult: 2024    Reason for Consultation:   Consults    History provided by: Pt  HPI:     Chief Complaint   Patient presents with    Abdominal Pain     HPI  Stephanie Belle is very pleasant  who is a 33 year old who presents to the ED medical history including ovarian cysts presents for complaint of right lower quadrant abdominal pain which began yesterday.  Some decreased appetite.  Pain is cramping, different from her usual pain with her cysts and much less than pain she has had in the past related to the ovaries. The pain persisted however and she suspected is was not due to her ovarian cysts.  No nausea vomiting diarrhea.  No dysuria hematuria urgency frequency.  No fever chills.    CTAP    CONCLUSION:  1. Acute retrocecal appendicitis with extensive periappendiceal phlegmon, but no definite evidence of clear periappendiceal abscess formation.     2. Complex-appearing right ovarian cysts, most likely functional/physiologic in etiology.     3. Enhancing intramural/subserosal uterine fibroids.      WBC 18.9    History     Past Medical History:    Ovarian cyst     History reviewed. No pertinent surgical history.  Family History   Problem Relation Age of Onset    Lipids Father      Social History:  Social History     Socioeconomic History    Marital status:    Tobacco Use    Smoking status: Never    Smokeless tobacco: Never   Vaping Use    Vaping status: Never Used   Substance and Sexual Activity    Alcohol use: Yes     Comment: social    Drug use: No   Other Topics Concern    Breast feeding No    Reaction to local anesthetic No    Pt has a pacemaker No    Pt has a defibrillator No      Allergies/Medications:   Allergies: No Known Allergies  (Not in a hospital admission)      Review of Systems:   Review of Systems       GENERAL: per hpi  SKIN: no ulcerated or worrisome skin lesions  EYES:denies blurred vision or double vision  HEENT: denies new nasal congestion, sinus pain or ST  LUNGS: denies shortness of breath with exertion  CARDIOVASCULAR: denies chest pain on exertion  GI: no hematemesis, no BRBPR, no worsening heartburn  : no dysuria, no blood in urine, no difficulty urinating  MUSCULOSKELETAL: no new musculoskeletal complaints  NEURO: no persistent, recurrent  headaches  PSYCHE:no depression or anxiety  HEMATOLOGIC: no hx of blood dyscrasia  ENDOCRINE: no new endocrine problems  ALL/ASTHMA: no new hx of severe allergy or asthma  BACK: normal, no spinal deformity, no CVA tenderness      Physical Exam:   Vital Signs:   height is 5' 7\" (1.702 m) and weight is 189 lb (85.7 kg). Her temporal temperature is 96.7 °F (35.9 °C). Her blood pressure is 105/81 and her pulse is 83. Her respiration is 16 and oxygen saturation is 98%.   Physical Exam  Alert, NAD  HEENT wnl, anicteric, PERRL  Neck supple, norm ROM, no JVD  L CTA B  H Reg rate  Abd soft, RLQ T, no rebound tnd, no guarding, ND, no masses, no hernias, no HSM.  Extr no c/c/e  Skin intact, no jaundice, no rashes, no lesions  Neuro grossly intact, no focal deficits, no tremors  Back no deformity, no CVA tnd.     Results:     Lab Results   Component Value Date    WBC 18.9 (H) 08/11/2024    HGB 14.2 08/11/2024    HCT 40.9 08/11/2024    .0 08/11/2024    CREATSERUM 0.69 08/11/2024    BUN 8 (L) 08/11/2024     08/11/2024    K 3.7 08/11/2024     08/11/2024    CO2 26.0 08/11/2024     (H) 08/11/2024    CA 9.6 08/11/2024    ALB 4.9 (H) 08/11/2024    ALKPHO 86 08/11/2024    BILT 0.9 08/11/2024    TP 8.3 (H) 08/11/2024    AST 12 08/11/2024    ALT 15 08/11/2024    TSH 1.480 06/06/2024    LIP 30 08/11/2024     US PELVIS EV W  DOPPLER (EET=43861/82576/14480)    Result Date: 8/11/2024  CONCLUSION:  1. No evidence of ovarian torsion.  2. Complex probable hemorrhagic cysts of the right ovary are noted.  3. Intramural and subserosal uterine fibroids.  4. Small volume free pelvic fluid.   5. Lesser incidental findings as above.    Dictated by (CST): Daniel Watson MD on 8/11/2024 at 2:04 PM     Finalized by (CST): Daniel Watson MD on 8/11/2024 at 2:08 PM          CT ABDOMEN+PELVIS(CONTRAST ONLY)(CPT=74177)    Result Date: 8/11/2024  CONCLUSION:  1. Acute retrocecal appendicitis with extensive periappendiceal phlegmon, but no definite evidence of clear periappendiceal abscess formation.  2. Complex-appearing right ovarian cysts, most likely functional/physiologic in etiology.  3. Enhancing intramural/subserosal uterine fibroids.  4. Lesser incidental findings as above.    Results of this examination were discussed with the patient's physician, Dr. Batista, by Dr. Watson at 1400 on 08/11/2024.   Dictated by (CST): Daniel Watson MD on 8/11/2024 at 1:59 PM     Finalized by (CST): Daniel Watson MD on 8/11/2024 at 2:04 PM               Impression:     Acute appendicitis, unspecified acute appendicitis type         Leukocytosis         Hyperglycemia            Recommendations:   Plan lap appy, possible open. Discussed CT findings and concern for phlegmon. Discussed management of phlegmon. We have discussed the surgical risks, benefits, alternatives, and expected recovery. All of the patient's questions have been answered to her satisfaction.    Abx  To OR when available.     Lorena Rios MD  8/11/2024

## 2024-08-11 NOTE — H&P
[facility]  part of [facility]  HISTORY AND PHYSICAL       Stephanie Belle Patient Status:  Emergency    1990  33 year old CSN [id number]   Location  Attending Callie Batista MD     PCP Jeannie Osullivan MD         DATE OF ADMISSION: 2024     CHIEF COMPLAINT: Abdominal pain    HISTORY OF PRESENT ILLNESS  This is a 33 year oldfemale who presented complaining of abdominal pain.  Patient stated pain started around 2 days prior to admission.  Stated the pain was sharp and cramping at times.  Seem to be located across her lower abdomen.  Pain was worse with movement.  Patient initially treated at the pain to possible ovarian cysts as she has a prior history.  Pain was improving, but remained persistent prompting visit to ED for further evaluation.  Patient did note some mild nausea, but denied emesis.  Patient denied diarrhea.  Patient otherwise denied chest pain, shortness of breath, fevers or chills.    PAST MEDICAL HISTORY  Past Medical History:    Ovarian cyst        PAST SURGICAL HISTORY  No past surgical history on file.    ALLERGIES   Patient has no known allergies.    MEDICATIONS  Patient's Medications   New Prescriptions    No medications on file   Previous Medications    TACROLIMUS (PROTOPIC) 0.1 % EXTERNAL OINTMENT    Apply 1 Application topically 2 (two) times daily.    TRIAMCINOLONE 0.025 % EXTERNAL CREAM    Apply 1 Application topically 2 (two) times daily.   Modified Medications    No medications on file   Discontinued Medications    No medications on file       SOCIAL HISTORY  Social History     Socioeconomic History    Marital status:    Tobacco Use    Smoking status: Never    Smokeless tobacco: Never   Vaping Use    Vaping status: Never Used   Substance and Sexual Activity    Alcohol use: Yes     Comment: social    Drug use: No   Other Topics Concern    Breast feeding No    Reaction to local anesthetic No    Pt has a pacemaker No    Pt has a defibrillator No        FAMILY HISTORY  Family History   Problem Relation Age of Onset    Lipids Father        PHYSICAL EXAM  Vital signs:  /75   Pulse 79   Temp 96.7 °F (35.9 °C) (Temporal)   Resp 16   Ht 5' 7\" (1.702 m)   Wt 189 lb (85.7 kg)   LMP 08/07/2024   SpO2 99%   BMI 29.60 kg/m²      GENERAL:  Awake and alert, in no acute distress.  HEART:  Regular rhythm.  Regular rate   LUNGS:  Air entry was good.  No crackles or wheezes   ABDOMEN: Soft and tender to palpation in right lower quadrant  PSYCHIATRIC: Normal mood      IMAGING  US PELVIS EV W DOPPLER (CPT=76856/36575/74101)    Result Date: 8/11/2024  CONCLUSION:  1. No evidence of ovarian torsion.  2. Complex probable hemorrhagic cysts of the right ovary are noted.  3. Intramural and subserosal uterine fibroids.  4. Small volume free pelvic fluid.   5. Lesser incidental findings as above.    Dictated by (CST): Daniel Watson MD on 8/11/2024 at 2:04 PM     Finalized by (CST): Daniel Watson MD on 8/11/2024 at 2:08 PM          CT ABDOMEN+PELVIS(CONTRAST ONLY)(CPT=74177)    Result Date: 8/11/2024  CONCLUSION:  1. Acute retrocecal appendicitis with extensive periappendiceal phlegmon, but no definite evidence of clear periappendiceal abscess formation.  2. Complex-appearing right ovarian cysts, most likely functional/physiologic in etiology.  3. Enhancing intramural/subserosal uterine fibroids.  4. Lesser incidental findings as above.    Results of this examination were discussed with the patient's physician, Dr. Batista, by Dr. Watson at 1400 on 08/11/2024.   Dictated by (CST): Daniel Watson MD on 8/11/2024 at 1:59 PM     Finalized by (CST): Daniel Watson MD on 8/11/2024 at 2:04 PM           Data:  Recent Labs   Lab 08/11/24  1107   RBC 4.98   HGB 14.2   HCT 40.9   MCV 82.1   MCH 28.5   MCHC 34.7   RDW 13.3   NEPRELIM 14.69*   WBC 18.9*   .0     Recent Labs   Lab 08/11/24  1107   *   BUN 8*   CREATSERUM 0.69   CA 9.6   ALB 4.9*      K 3.7       CO2 26.0   ALKPHO 86   AST 12   ALT 15   BILT 0.9   TP 8.3*       ASSESSMENT/PLAN      Acute appendicitis, unspecified acute appendicitis type  -Patient presenting with abdominal pain  -CT abdomen pelvis reviewed.  Noted acute retrocecal appendicitis with extensive periappendiceal phlegmon, but no definitive abscess.  -Starting empiric antibiotics and IV fluids  -Pain control as needed  -Keep n.p.o.  -General Surgery consulted  -Continue to monitor      Leukocytosis  -Likely secondary to acute appendicitis as above  -Starting empiric antibiotics  -Continue to monitor    History of ovarian cyst  -CT abdomen pelvis did note complex appearing right ovarian cyst.  -Continue to monitor  -Pain relief as needed      Plan of care discussed with patient at bedside.  Discussed with ED physician and RN. Decision made that pt needs hospitalization for further management/monitoring.      Dennis Simon MD    This note was prepared using Dragon Medical voice recognition dictation software. As a result errors may occur. When identified these errors have been corrected. While every attempt is made to correct errors during dictation discrepancies may still exist

## 2024-08-12 ENCOUNTER — TELEPHONE (OUTPATIENT)
Dept: SURGERY | Facility: CLINIC | Age: 34
End: 2024-08-12

## 2024-08-12 VITALS
HEIGHT: 67 IN | DIASTOLIC BLOOD PRESSURE: 78 MMHG | BODY MASS INDEX: 29.3 KG/M2 | TEMPERATURE: 98 F | WEIGHT: 186.69 LBS | SYSTOLIC BLOOD PRESSURE: 118 MMHG | RESPIRATION RATE: 18 BRPM | OXYGEN SATURATION: 95 % | HEART RATE: 65 BPM

## 2024-08-12 LAB
ALBUMIN SERPL-MCNC: 4.2 G/DL (ref 3.2–4.8)
ALBUMIN/GLOB SERPL: 1.4 {RATIO} (ref 1–2)
ALP LIVER SERPL-CCNC: 76 U/L
ALT SERPL-CCNC: 13 U/L
ANION GAP SERPL CALC-SCNC: 7 MMOL/L (ref 0–18)
AST SERPL-CCNC: 11 U/L (ref ?–34)
BASOPHILS # BLD AUTO: 0.03 X10(3) UL (ref 0–0.2)
BASOPHILS NFR BLD AUTO: 0.2 %
BILIRUB SERPL-MCNC: 0.5 MG/DL (ref 0.3–1.2)
BUN BLD-MCNC: 6 MG/DL (ref 9–23)
BUN/CREAT SERPL: 9.8 (ref 10–20)
CALCIUM BLD-MCNC: 8.9 MG/DL (ref 8.7–10.4)
CHLORIDE SERPL-SCNC: 107 MMOL/L (ref 98–112)
CO2 SERPL-SCNC: 25 MMOL/L (ref 21–32)
CREAT BLD-MCNC: 0.61 MG/DL
DEPRECATED RDW RBC AUTO: 40.4 FL (ref 35.1–46.3)
EGFRCR SERPLBLD CKD-EPI 2021: 122 ML/MIN/1.73M2 (ref 60–?)
EOSINOPHIL # BLD AUTO: 0 X10(3) UL (ref 0–0.7)
EOSINOPHIL NFR BLD AUTO: 0 %
ERYTHROCYTE [DISTWIDTH] IN BLOOD BY AUTOMATED COUNT: 13.2 % (ref 11–15)
GLOBULIN PLAS-MCNC: 3 G/DL (ref 2–3.5)
GLUCOSE BLD-MCNC: 143 MG/DL (ref 70–99)
HBV SURFACE AG SER-ACNC: 0.37 [IU]/L
HBV SURFACE AG SERPL QL IA: NONREACTIVE
HCT VFR BLD AUTO: 36.2 %
HGB BLD-MCNC: 12.4 G/DL
IMM GRANULOCYTES # BLD AUTO: 0.09 X10(3) UL (ref 0–1)
IMM GRANULOCYTES NFR BLD: 0.5 %
LYMPHOCYTES # BLD AUTO: 1.08 X10(3) UL (ref 1–4)
LYMPHOCYTES NFR BLD AUTO: 6.6 %
MAGNESIUM SERPL-MCNC: 1.9 MG/DL (ref 1.6–2.6)
MCH RBC QN AUTO: 28.6 PG (ref 26–34)
MCHC RBC AUTO-ENTMCNC: 34.3 G/DL (ref 31–37)
MCV RBC AUTO: 83.4 FL
MONOCYTES # BLD AUTO: 0.75 X10(3) UL (ref 0.1–1)
MONOCYTES NFR BLD AUTO: 4.6 %
NEUTROPHILS # BLD AUTO: 14.46 X10 (3) UL (ref 1.5–7.7)
NEUTROPHILS # BLD AUTO: 14.46 X10(3) UL (ref 1.5–7.7)
NEUTROPHILS NFR BLD AUTO: 88.1 %
OSMOLALITY SERPL CALC.SUM OF ELEC: 288 MOSM/KG (ref 275–295)
PLATELET # BLD AUTO: 325 10(3)UL (ref 150–450)
POTASSIUM SERPL-SCNC: 3.6 MMOL/L (ref 3.5–5.1)
PROT SERPL-MCNC: 7.2 G/DL (ref 5.7–8.2)
RBC # BLD AUTO: 4.34 X10(6)UL
SODIUM SERPL-SCNC: 139 MMOL/L (ref 136–145)
WBC # BLD AUTO: 16.4 X10(3) UL (ref 4–11)

## 2024-08-12 PROCEDURE — 99239 HOSP IP/OBS DSCHRG MGMT >30: CPT | Performed by: INTERNAL MEDICINE

## 2024-08-12 RX ORDER — HYDROCODONE BITARTRATE AND ACETAMINOPHEN 5; 325 MG/1; MG/1
1 TABLET ORAL EVERY 6 HOURS PRN
Qty: 15 TABLET | Refills: 0 | Status: SHIPPED | OUTPATIENT
Start: 2024-08-12

## 2024-08-12 RX ORDER — AMOXICILLIN AND CLAVULANATE POTASSIUM 875; 125 MG/1; MG/1
1 TABLET, FILM COATED ORAL 2 TIMES DAILY
Qty: 28 TABLET | Refills: 0 | Status: SHIPPED | OUTPATIENT
Start: 2024-08-12

## 2024-08-12 NOTE — OPERATIVE REPORT
Operative Report    Patient Name:  Stephanie Belle  MR:  [id number]  :  1990  DOS:  24    Preop Dx:  Acute appendicitis [K35.80]  Postop Dx:  Acute appendicitis with phlegmon involving appendix, ileum, cecum  Procedure:  Laparoscopic appendectomy placement of 10 flat Remigio Fischer drain  Surgeon:  Lorena Rios MD   Surgical Assistant.: Charli Askew CSA  EBL: No data recorded  Complication:  None    INDICATION:  Pt is a 33 year old female who developed right lower quadrant pain yesterday and CT findings are consistent with Acute appendicitis who is scheduled for a LAPAROSCOPIC  APPENDECTOMY.    CONSENT:  An informed consent discussion was held with the patient regarding the nature of acute appendicitis, phlegmon, the treatment options, possible partial resection and drain placement, possible laparotomy,  and the details of the procedure.  The risks including but not limited to bleeding, wound infection, intra-abdominal infection, injury to the colon, small intestine, right ureter, incomplete resection, and incisional hernia were discussed.  The patient expressed understanding and wants to proceed with the planned procedure.    TECHNIQUE:  The patient was taken to the OR and placed in supine position.  General anesthesia was established and the abdomen was prepped in standard fashion.  Pneumoperitoneum was obtained using open technique through a 1 cm umbilical incision.  A 12-mm trocar was inserted under direct visualization  without complication. Examination of the abdomen showed an inflamed and dilated inflamed appendix and inflammatory changes of the terminal ileum, appendix, cecum consistent with acute non-perforated phlegmonous appendicitis.  Two 5-mm trocars were placed in the LLQ and supra-pubic area.  The patient was placed in Trendelenburg position.  The appendix was freed up to the point of dense inflammatory thickening densely adherent to the base of the cecum, this was done with  blunt dissection and gentle manipulation from side to side. The mesoappendix was divided using the ligasure. The division was complete and hemostatic.  The appendix was divided using a 45 x 3.5 mm linear stapler.  The staple line was complete, intact, and hemostatic. The appendix was delivered from the abdomen using an endocatch bag.  The abdomen was irrigated with saline and found to be hemostatic.  The staple line was hemostatic. The drain was placed at the staple line and brought out through the right sided trocar incision. The ports were withdrawn under direct visualization and no port site bleeding was seen.  The drain was sutured into place with 3.0 nylon suture. The umbilical fascia and the left lower quadrant incision were closed using 0 vicryl.  The skin incisions were closed using 4-0 vicryl.  Marcaine was injected and dermabond applied. Steri strips were applied.  All instrument and sponge counts were correct. The estimated blood loss was less than 15cc.  I was present during the entire procedure.        Lorena Rios MD

## 2024-08-12 NOTE — ADDENDUM NOTE
Addendum  created 08/11/24 2328 by Lucia Cortez DO    Clinical Note Signed, Review and Sign - Signed

## 2024-08-12 NOTE — PROGRESS NOTES
[facility]  part of [facility]    Progress Note    Stephanie Belle Patient Status:  Observation    1990 MRN [id number]   Location [facility] 4W/SW/SE Attending Dennis Simon MD   Hosp Day # 0 PCP Jeannie Osullivan MD       Subjective:   POD1 lap appy. Expected postop pain, pt otherwise feeling well. Tolerating diet without nausea or vomiting, ambulating.     Objective:   Vital Signs:  Blood pressure 108/65, pulse 78, temperature 98.2 °F (36.8 °C), temperature source Oral, resp. rate 18, height 5' 7\" (1.702 m), weight 186 lb 11.2 oz (84.7 kg), last menstrual period 2024, SpO2 95%.    Physical Exam     General: No acute distress. Alert and oriented x 3.  HEENT: Moist mucous membranes. Anicteric.   Neck: Supple, No JVD.   Respiratory: Nonlabored resp.  Cardiovascular: Regular rate.   Abdomen: Soft, expected incisional tenderness, no rebound tnd, no guarding, nondistended.  No masses. Normal bowel sounds. Incisions c/d/I, BERNADETTE serosanguineous  Neurologic: No focal neurological deficits.  Musculoskeletal: Full range of motion of all extremities. No calf tenderness. No swelling noted.  Integument: No lesions. No erythema.  Psychiatric: Appropriate mood and affect.      Assessment and Plan:     Acute appendicitis, unspecified acute appendicitis type      Leukocytosis      Hyperglycemia    POD1 lap appy with phlegmon. Pt recovering well. Stable for discharge home from surgical standpoint on 14d augmentin. Would recommend pt follow up with GI as outpatient for colonoscopy. Follow up in 1-2 weeks for postoperative appointment and drain check.    Results:     Lab Results   Component Value Date    WBC 16.4 (H) 2024    HGB 12.4 2024    HCT 36.2 2024    .0 2024    CREATSERUM 0.61 2024    BUN 6 (L) 2024     2024    K 3.6 2024     2024    CO2 25.0 2024     (H) 2024    CA 8.9 2024    ALB 4.2  08/12/2024    ALKPHO 76 08/12/2024    BILT 0.5 08/12/2024    TP 7.2 08/12/2024    AST 11 08/12/2024    ALT 13 08/12/2024    TSH 1.480 06/06/2024    LIP 30 08/11/2024    MG 1.9 08/12/2024       US PELVIS EV W DOPPLER (CPT=76856/49420/20140)    Result Date: 8/11/2024  CONCLUSION:  1. No evidence of ovarian torsion.  2. Complex probable hemorrhagic cysts of the right ovary are noted.  3. Intramural and subserosal uterine fibroids.  4. Small volume free pelvic fluid.   5. Lesser incidental findings as above.    Dictated by (CST): Daniel Watson MD on 8/11/2024 at 2:04 PM     Finalized by (CST): Daniel Watson MD on 8/11/2024 at 2:08 PM          CT ABDOMEN+PELVIS(CONTRAST ONLY)(CPT=74177)    Result Date: 8/11/2024  CONCLUSION:  1. Acute retrocecal appendicitis with extensive periappendiceal phlegmon, but no definite evidence of clear periappendiceal abscess formation.  2. Complex-appearing right ovarian cysts, most likely functional/physiologic in etiology.  3. Enhancing intramural/subserosal uterine fibroids.  4. Lesser incidental findings as above.    Results of this examination were discussed with the patient's physician, Dr. Batista, by Dr. Watson at 1400 on 08/11/2024.   Dictated by (CST): Daniel Watson MD on 8/11/2024 at 1:59 PM     Finalized by (CST): Daniel Watson MD on 8/11/2024 at 2:04 PM                       Edy Art PA-C  8/12/2024

## 2024-08-12 NOTE — DISCHARGE SUMMARY
[facility]  part of [facility]    Discharge Summary    Stephanie Belle Patient Status:  Observation    1990 MRN [id number]   Location [facility] 4W/SW/SE Attending Dennis Simon MD   Hosp Day # 0 PCP Jeannie Osullivan MD     Date of Admission: 2024     Date of Discharge: 24      Lace+ Score: 18  59-90 High Risk  29-58 Medium Risk  0-28   Low Risk.    TCM Follow-Up Recommendation:  LACE < 29: Low Risk of readmission after discharge from the hospital. No TCM follow-up needed.    DISCHARGE DX: Principal Problem:    Acute appendicitis, unspecified acute appendicitis type  Active Problems:    Leukocytosis    Hyperglycemia       The patient was seen and examined on day of discharge and this discharge summary is in conjunction with any daily progress note from day of discharge.    HPI per admitting physician: \"This is a 33 year oldfemale who presented complaining of abdominal pain.  Patient stated pain started around 2 days prior to admission.  Stated the pain was sharp and cramping at times.  Seem to be located across her lower abdomen.  Pain was worse with movement.  Patient initially treated at the pain to possible ovarian cysts as she has a prior history.  Pain was improving, but remained persistent prompting visit to ED for further evaluation.  Patient did note some mild nausea, but denied emesis.  Patient denied diarrhea.  Patient otherwise denied chest pain, shortness of breath, fevers or chills. \"    Hospital Course:      Acute appendicitis, unspecified acute appendicitis type  -Patient presenting with abdominal pain  -CT abdomen pelvis reviewed.  Noted acute retrocecal appendicitis with extensive periappendiceal phlegmon, but no definitive abscess.  -Started on empiric antibiotics and IV fluids  -Pain control as needed  -General Surgery consulted  -S/p Lap appy  -Plan to complete course of PO abx on dc       Leukocytosis  -Likely secondary to acute appendicitis as  above  -Complete course of antibiotics  -Continue to monitor     History of ovarian cyst  -CT abdomen pelvis did note complex appearing right ovarian cyst.  -Continue to monitor  -Pain relief as needed    Physical Exam:    Vitals:    08/11/24 1955 08/11/24 2013 08/11/24 2014 08/12/24 0445   BP: 114/56 116/62  108/65   BP Location: Left arm Left arm  Left arm   Pulse: 79 76 78    Resp: 19 20  18   Temp: 98.5 °F (36.9 °C) 98.2 °F (36.8 °C)     TempSrc: Temporal Oral  Oral   SpO2: 95% 94%  95%   Weight:       Height:         Patient Weight for the past 72 hrs:   Weight   08/11/24 0953 189 lb (85.7 kg)   08/11/24 1559 186 lb 11.2 oz (84.7 kg)       Intake/Output Summary (Last 24 hours) at 8/12/2024 1055  Last data filed at 8/12/2024 0600  Gross per 24 hour   Intake 4040 ml   Output 1125 ml   Net 2915 ml         GENERAL:  Awake and alert, in no acute distress.  HEART:  S1 and S2 heard.  RRR   LUNGS:  Air entry was good.  No crackles or wheezes   ABDOMEN: Soft and mildly-tender.    PSYCHIATRIC: Normal mood    CULTURE:   No results found for this visit on 08/11/24.    IMAGING STUDIES: SOME MAY NEED FOLLOW UP WITH PCP   US PELVIS EV W DOPPLER (CPT=76856/01757/85004)    Result Date: 8/11/2024  CONCLUSION:  1. No evidence of ovarian torsion.  2. Complex probable hemorrhagic cysts of the right ovary are noted.  3. Intramural and subserosal uterine fibroids.  4. Small volume free pelvic fluid.   5. Lesser incidental findings as above.    Dictated by (CST): Daniel Watson MD on 8/11/2024 at 2:04 PM     Finalized by (CST): Daniel Watson MD on 8/11/2024 at 2:08 PM          CT ABDOMEN+PELVIS(CONTRAST ONLY)(CPT=74177)    Result Date: 8/11/2024  CONCLUSION:  1. Acute retrocecal appendicitis with extensive periappendiceal phlegmon, but no definite evidence of clear periappendiceal abscess formation.  2. Complex-appearing right ovarian cysts, most likely functional/physiologic in etiology.  3. Enhancing intramural/subserosal uterine  fibroids.  4. Lesser incidental findings as above.    Results of this examination were discussed with the patient's physician, Dr. Batista, by Dr. Watson at 1400 on 08/11/2024.   Dictated by (CST): Daniel Watson MD on 8/11/2024 at 1:59 PM     Finalized by (CST): Daniel Watson MD on 8/11/2024 at 2:04 PM           LABS :     Lab Results   Component Value Date    WBC 16.4 (H) 08/12/2024    HGB 12.4 08/12/2024    HCT 36.2 08/12/2024    .0 08/12/2024    CREATSERUM 0.61 08/12/2024    BUN 6 (L) 08/12/2024     08/12/2024    K 3.6 08/12/2024     08/12/2024    CO2 25.0 08/12/2024     (H) 08/12/2024    CA 8.9 08/12/2024    ALB 4.2 08/12/2024    ALKPHO 76 08/12/2024    BILT 0.5 08/12/2024    TP 7.2 08/12/2024    AST 11 08/12/2024    ALT 13 08/12/2024    TSH 1.480 06/06/2024    LIP 30 08/11/2024    MG 1.9 08/12/2024       Recent Labs   Lab 08/11/24  1107 08/12/24  0512   RBC 4.98 4.34   HGB 14.2 12.4   HCT 40.9 36.2   MCV 82.1 83.4   MCH 28.5 28.6   MCHC 34.7 34.3   RDW 13.3 13.2   NEPRELIM 14.69* 14.46*   WBC 18.9* 16.4*   .0 325.0     Recent Labs   Lab 08/11/24  1107 08/12/24  0512   * 143*   BUN 8* 6*   CREATSERUM 0.69 0.61   CA 9.6 8.9   ALB 4.9* 4.2    139   K 3.7 3.6    107   CO2 26.0 25.0   ALKPHO 86 76   AST 12 11   ALT 15 13   BILT 0.9 0.5   TP 8.3* 7.2     No results found for: \"PT\", \"INR\"    Disposition: Discharge to Home    Condition at Discharge: Stable     Discharge Medications:      Discharge Medications        START taking these medications        Instructions Prescription details   amoxicillin clavulanate 875-125 MG Tabs  Commonly known as: Augmentin      Take 1 tablet by mouth 2 (two) times daily.   Quantity: 28 tablet  Refills: 0     HYDROcodone-acetaminophen 5-325 MG Tabs  Commonly known as: Norco      Take 1 tablet by mouth every 6 (six) hours as needed.   Quantity: 15 tablet  Refills: 0            CONTINUE taking these medications        Instructions  Prescription details   tacrolimus 0.1 % Oint  Commonly known as: Protopic      Apply 1 Application topically 2 (two) times daily.   Quantity: 60 g  Refills: 3               Where to Get Your Medications        These medications were sent to [facility] [address] [place] [address] [address] AT [facility] [address], [phone], [phone] [address], [facility] [address]      Phone: [phone]   amoxicillin clavulanate 875-125 MG Tabs  HYDROcodone-acetaminophen 5-325 MG Tabs         Follow up Visits  No follow-up provider specified.  Jeannie Osullivan MD    Consultants         Provider   Role Specialty     Dennis Simon MD      Consulting Physician HOSPITALIST     Lorena Rios MD      Consulting Physician SURGERY, GENERAL              Other Discharge Instructions:       ----------------------------------------------------  34 MIN SPENT ON THIS DC   Dennis Simon MD    8/12/2024

## 2024-08-12 NOTE — PROGRESS NOTES
[facility] Pharmacy Note:  Renal Adjustment for ceftriaxone (ROCEPHIN) and metronidazole (FLAGYL)    Stephanie Belle is a 33 year old patient who has been prescribed ceftriaxone (ROCEPHIN) 1g q24h and metronidazole (FLAGYL) 500 mg every 8 hrs.  The estimated creatinine clearance is 113.8 mL/min (based on SCr of 0.69 mg/dL). The dose has been adjusted to ceftriaxone (ROCEPHIN) 2g and metronidazole (FLAGYL) 500 mg every 12 hrs per hospital renal dose adjustment protocol for treatment of intra-abdominal infection.  Pharmacy will follow and adjust dose as warranted for additional renal function changes.    Thank you,    Kat Parry, PharmD  8/11/2024  8:24 PM

## 2024-08-12 NOTE — ANESTHESIA POSTPROCEDURE EVALUATION
Patient: Stephanie Belle    Procedure Summary       Date: 08/11/24 Room / Location: [facility] MAIN OR  / [facility] MAIN OR    Anesthesia Start: 1719 Anesthesia Stop: 1920    Procedure: LAPAROSCOPIC  APPENDECTOMY (Abdomen) Diagnosis:       Acute appendicitis      Phlegmon      (acute appendecitis and inflamed phlegmon involving ileium and cecum)    Surgeons: Lorena Rios MD Anesthesiologist: Lucia Cortez DO    Anesthesia Type: general ASA Status: 2            Anesthesia Type: general    Vitals Value Taken Time   /71 08/11/24 1915   Temp 98.2 08/11/24 1921   Pulse 90 08/11/24 1920   Resp 16 08/11/24 1920   SpO2 96 % 08/11/24 1920   Vitals shown include unfiled device data.    [facility] AN Post Evaluation:   Patient Evaluated in PACU  Patient Participation: complete - patient participated  Level of Consciousness: awake  Pain Score: 0  Pain Management: adequate  Airway Patency:patent  Dental exam unchanged from preop  Yes    Cardiovascular Status: hemodynamically stable  Respiratory Status: spontaneous ventilation, airway suctioned, unassisted, nonlabored ventilation and room air  Postoperative Hydration stable      Lucia Cortez DO  8/11/2024 7:21 PM

## 2024-08-12 NOTE — PLAN OF CARE
Problem: Patient Centered Care  Goal: Patient preferences are identified and integrated in the patient's plan of care  Description: Interventions:  - What would you like us to know as we care for you? I want to be update with POC  - Provide timely, complete, and accurate information to patient/family  - Incorporate patient and family knowledge, values, beliefs, and cultural backgrounds into the planning and delivery of care  - Encourage patient/family to participate in care and decision-making at the level they choose  - Honor patient and family perspectives and choices  Outcome: Progressing     Problem: Patient/Family Goals  Goal: Patient/Family Long Term Goal  Description: Patient's Long Term Goal: return home    Interventions:  - follow plan of care  - See additional Care Plan goals for specific interventions  Outcome: Progressing  Goal: Patient/Family Short Term Goal  Description: Patient's Short Term Goal:     Interventions: pain and nausae resolve  - surgery  - PRN medications  - monitor pain and nausea.  - ambulation post op  - See additional Care Plan goals for specific interventions  Outcome: Progressing     Problem: PAIN - ADULT  Goal: Verbalizes/displays adequate comfort level or patient's stated pain goal  Description: INTERVENTIONS:  - Encourage pt to monitor pain and request assistance  - Assess pain using appropriate pain scale  - Administer analgesics based on type and severity of pain and evaluate response  - Implement non-pharmacological measures as appropriate and evaluate response  - Consider cultural and social influences on pain and pain management  - Manage/alleviate anxiety  - Utilize distraction and/or relaxation techniques  - Monitor for opioid side effects  - Notify MD/LIP if interventions unsuccessful or patient reports new pain  - Anticipate increased pain with activity and pre-medicate as appropriate  Outcome: Progressing     Problem: RISK FOR INFECTION - ADULT  Goal: Absence of  fever/infection during anticipated neutropenic period  Description: INTERVENTIONS  - Monitor WBC  - Administer growth factors as ordered  - Implement neutropenic guidelines  Outcome: Progressing     Problem: SAFETY ADULT - FALL  Goal: Free from fall injury  Description: INTERVENTIONS:  - Assess pt frequently for physical needs  - Identify cognitive and physical deficits and behaviors that affect risk of falls.  - Britt fall precautions as indicated by assessment.  - Educate pt/family on patient safety including physical limitations  - Instruct pt to call for assistance with activity based on assessment  - Modify environment to reduce risk of injury  - Provide assistive devices as appropriate  - Consider OT/PT consult to assist with strengthening/mobility  - Encourage toileting schedule  Outcome: Progressing     Problem: GASTROINTESTINAL - ADULT  Goal: Minimal or absence of nausea and vomiting  Description: INTERVENTIONS:  - Maintain adequate hydration with IV or PO as ordered and tolerated  - Nasogastric tube to low intermittent suction as ordered  - Evaluate effectiveness of ordered antiemetic medications  - Provide nonpharmacologic comfort measures as appropriate  - Advance diet as tolerated, if ordered  - Obtain nutritional consult as needed  - Evaluate fluid balance  Outcome: Progressing     Problem: GASTROINTESTINAL - ADULT  Goal: Maintains or returns to baseline bowel function  Description: INTERVENTIONS:  - Assess bowel function  - Maintain adequate hydration with IV or PO as ordered and tolerated  - Evaluate effectiveness of GI medications  - Encourage mobilization and activity  - Obtain nutritional consult as needed  - Establish a toileting routine/schedule  - Consider collaborating with pharmacy to review patient's medication profile  Outcome: Progressing     Problem: GASTROINTESTINAL - ADULT  Goal: Maintains adequate nutritional intake (undernourished)  Description: INTERVENTIONS:  - Monitor  percentage of each meal consumed  - Identify factors contributing to decreased intake, treat as appropriate  - Assist with meals as needed  - Monitor I&O, WT and lab values  - Obtain nutritional consult as needed  - Optimize oral hygiene and moisture  - Encourage food from home; allow for food preferences  - Enhance eating environment  Outcome: Progressing     Problem: SKIN/TISSUE INTEGRITY - ADULT  Goal: Skin integrity remains intact  Description: INTERVENTIONS  - Assess and document risk factors for pressure ulcer development  - Assess and document skin integrity  - Monitor for areas of redness and/or skin breakdown  - Initiate interventions, skin care algorithm/standards of care as needed  Outcome: Progressing  Goal: Incision(s), wounds(s) or drain site(s) healing without S/S of infection  Description: INTERVENTIONS:  - Assess and document risk factors for pressure ulcer development  - Assess and document skin integrity  - Assess and document dressing/incision, wound bed, drain sites and surrounding tissue  - Implement wound care per orders  - Initiate isolation precautions as appropriate  - Initiate Pressure Ulcer prevention bundle as indicated  Outcome: Progressing   Patient is s/p lap appe with BERNADETTE drain placement. Moderate pain on surgical site, patient declined pain medications. Denies nausea. Tolerating diet. RLQ BERNADETTE drain in place. Surgical lap sites clean, dry and intact. Voids freely. Ambulates with standby assist. IV fluids infusing. Encouraged to use incentive spirometer. Plan of care discussed.

## 2024-08-12 NOTE — PLAN OF CARE
Patient discharged home with her . Reviewed drain care. Patient demonstrates emptying drain correctly. Follow up appointment reviewed. Scripts for pain medication and antibiotic sent to patient's pharmacy. All discharge criteria met.

## 2024-08-21 ENCOUNTER — OFFICE VISIT (OUTPATIENT)
Dept: SURGERY | Facility: CLINIC | Age: 34
End: 2024-08-21
Payer: COMMERCIAL

## 2024-08-21 DIAGNOSIS — Z48.89 ENCOUNTER FOR POSTOPERATIVE CARE: Primary | ICD-10-CM

## 2024-08-21 PROCEDURE — 99024 POSTOP FOLLOW-UP VISIT: CPT

## 2024-08-21 NOTE — PROGRESS NOTES
Chief Complaint   Patient presents with    Post-Op     S/P Laparoscopic appendectomy placement of 10 flat Remigio Fischer drain on 8/11/2024. Pt denies pain at present, she does get some pain when doing too much or sitting too long. BERNADETTE drain has put out 20-35mls/ day for the past few days.        S:  Stephanie Belle is a 33 year old female sp Lap Appy  Doing well, no fevers, no chills, tolerating a general diet, generally normal bowel movements, no calf tenderness nor lower extremity edema, no shortness of breath, no chest pain. She needs a doctors note for work.      O:  LMP 08/07/2024   GEN:  No acute distress  L: nonlabored respirations  H: reg rate  Abd:  Soft, NT,ND, incisions C/D/I, no erythema. BERNADETTE drain with serous output, removed drain.   Extr: No edema, no calf tenderness, neg Peter's sign    Path:  Reviewed w pt    Assessment   1. Encounter for postoperative care        Doing well sp Lap Appy.  Continue to avoid heavy lifting for another month.  Maintain a healthy diet.  Maintain good hydration.  F/u prn.         CHARBEL Mora

## 2024-09-10 ENCOUNTER — TELEPHONE (OUTPATIENT)
Dept: SURGERY | Facility: CLINIC | Age: 34
End: 2024-09-10

## 2024-09-10 NOTE — TELEPHONE ENCOUNTER
Patient indicates her work is requesting another note for patient clearing her to work. Patient needs to present work to her employment by 9/16. Please send to patients ahmet, luiz.   *Patient informed of the 3 business day turnaround time

## 2024-11-13 ENCOUNTER — NURSE TRIAGE (OUTPATIENT)
Dept: FAMILY MEDICINE CLINIC | Facility: CLINIC | Age: 34
End: 2024-11-13

## 2024-11-13 ENCOUNTER — OFFICE VISIT (OUTPATIENT)
Dept: FAMILY MEDICINE CLINIC | Facility: CLINIC | Age: 34
End: 2024-11-13

## 2024-11-13 VITALS
BODY MASS INDEX: 30.04 KG/M2 | DIASTOLIC BLOOD PRESSURE: 76 MMHG | HEIGHT: 67 IN | HEART RATE: 69 BPM | WEIGHT: 191.38 LBS | SYSTOLIC BLOOD PRESSURE: 123 MMHG

## 2024-11-13 DIAGNOSIS — R07.9 CHEST PAIN, UNSPECIFIED TYPE: Primary | ICD-10-CM

## 2024-11-13 LAB
ATRIAL RATE: 69 BPM
P AXIS: 37 DEGREES
P-R INTERVAL: 172 MS
Q-T INTERVAL: 406 MS
QRS DURATION: 68 MS
QTC CALCULATION (BEZET): 435 MS
R AXIS: 11 DEGREES
T AXIS: 6 DEGREES
VENTRICULAR RATE: 69 BPM

## 2024-11-13 PROCEDURE — 93000 ELECTROCARDIOGRAM COMPLETE: CPT | Performed by: FAMILY MEDICINE

## 2024-11-13 PROCEDURE — 99213 OFFICE O/P EST LOW 20 MIN: CPT | Performed by: FAMILY MEDICINE

## 2024-11-13 PROCEDURE — 3074F SYST BP LT 130 MM HG: CPT | Performed by: FAMILY MEDICINE

## 2024-11-13 PROCEDURE — 3008F BODY MASS INDEX DOCD: CPT | Performed by: FAMILY MEDICINE

## 2024-11-13 PROCEDURE — 3078F DIAST BP <80 MM HG: CPT | Performed by: FAMILY MEDICINE

## 2024-11-13 RX ORDER — IBUPROFEN 600 MG/1
600 TABLET, FILM COATED ORAL EVERY 6 HOURS PRN
Qty: 60 TABLET | Refills: 0 | Status: SHIPPED | OUTPATIENT
Start: 2024-11-13

## 2024-11-13 NOTE — TELEPHONE ENCOUNTER
Left message to call us  back.  Appointment scheduled for 11/30/24 for chest pain.  Has not read her [employer] message.

## 2024-11-13 NOTE — TELEPHONE ENCOUNTER
Action Requested: Summary for Provider     []  Critical Lab, Recommendations Needed  [] Need Additional Advice  [x]   FYI    []   Need Orders  [] Need Medications Sent to Pharmacy  []  Other     SUMMARY: Patient stated that since 11/5/2024 has been having on and off chest pain on the left side of her chest, top of the breast. Lasts about 5-10 minutes. Pain is usually dull. Last time had the pain was yesterday afternoon. Yesterday in the morning at 6am after getting off of work pain was sharp. Pain goes away on it own. No stress or anxiety. Not having symptoms currently. Also has been having on and off numbness in her left arm and left leg. No weakness. Happens when she is sitting or laying down. Does sit a lot. No surgery in the last month, but did have surgery in August 2024. No other symptoms. Advised patient that she should be seen in the office today. Cancelled 11/30/2024 appointment.  No appointments at [facility] today. Appointment made for today at 12:15pm with Dr Sanchez in [place]-address provided.  Reason for call: Chest Pain and Numbness (Numbness left am/leg)  Onset:  Nov 5, 2024  Reason for Disposition   Chest pain(s) lasting a few seconds persists > 3 days   Numbness or tingling in one or both hands is a chronic symptom (recurrent or ongoing problem lasting > 4 weeks)    Protocols used: Chest Pain-A-[place], Neurologic Deficit-A-[place]

## 2024-11-13 NOTE — PROGRESS NOTES
11/13/2024  12:16 PM    Stephanie Belle is a 33 year old female.    Chief complaint(s):   Chief Complaint   Patient presents with    Chest Pain     Triage TE today      HPI:     Stephanie Belle primary complaint is regarding chest pain.       Stephanie Belle is a 33 year old female who presents for evaluation of chest pain Onset was 8 days ago, with unchanged course since that time.  The patient admits to chest discomfort that is intermittent, located in the left chest. with radiation to left arm, rated as a scale of 5-10 in intensity that is sharp in nature.  Associated symptoms are none. Aggravating factors are none.  Alleviating factors are: none. Patient's cardiac risk factors are none.  Previous cardiac testing includes: none.       HISTORY:  Past Medical History:    Ovarian cyst      Past Surgical History:   Procedure Laterality Date    Appendectomy  08/11/2024    Laparoscopic Appendectomy placement of 10 flat Remigio Fischer drain      Family History   Problem Relation Age of Onset    Lipids Father       Social History:   Social History     Socioeconomic History    Marital status:    Tobacco Use    Smoking status: Never    Smokeless tobacco: Never   Vaping Use    Vaping status: Never Used   Substance and Sexual Activity    Alcohol use: Yes     Comment: social    Drug use: No   Other Topics Concern    Breast feeding No    Reaction to local anesthetic No    Pt has a pacemaker No    Pt has a defibrillator No     Social Drivers of Health     Food Insecurity: No Food Insecurity (8/11/2024)    Food Insecurity     Food Insecurity: Never true   Transportation Needs: No Transportation Needs (8/11/2024)    Transportation Needs     Lack of Transportation: No   Housing Stability: Low Risk  (8/11/2024)    Housing Stability     Housing Instability: No        Immunizations:   Immunization History   Administered Date(s) Administered    Covid-19 Vaccine Pfizer 30 mcg/0.3 ml 08/12/2021, 09/02/2021    DTAP 08/07/1996    HEP B, Ped/Adol  08/06/1997       Medications (Active prior to today's visit):  Current Outpatient Medications   Medication Sig Dispense Refill    ibuprofen 600 MG Oral Tab Take 1 tablet (600 mg total) by mouth every 6 (six) hours as needed for Pain. Always take it with food. 60 tablet 0       Allergies:  Allergies[1]      ROS:   Review of Systems   Constitutional:  Negative for appetite change and fever.   Eyes:  Negative for visual disturbance.   Respiratory:  Negative for shortness of breath.    Cardiovascular:  Positive for chest pain. Negative for palpitations.   Gastrointestinal:  Negative for abdominal pain, nausea and vomiting.   Musculoskeletal:  Negative for back pain.   Skin:  Negative for rash.   Neurological:  Negative for dizziness and headaches.       PHYSICAL EXAM:   VS: /76   Pulse 69   Ht 5' 7\" (1.702 m)   Wt 191 lb 6.4 oz (86.8 kg)   LMP 10/20/2024 (Approximate)   BMI 29.98 kg/m²     Physical Exam  Vitals reviewed.   Constitutional:       General: She is not in acute distress.     Appearance: Normal appearance.   HENT:      Head: Normocephalic.   Eyes:      Conjunctiva/sclera: Conjunctivae normal.   Cardiovascular:      Rate and Rhythm: Normal rate and regular rhythm.      Heart sounds: Normal heart sounds. No murmur heard.     Comments: Mild left chest tenderness   Pulmonary:      Effort: Pulmonary effort is normal.   Musculoskeletal:      Cervical back: Neck supple.   Skin:     Findings: No rash.   Psychiatric:         Mood and Affect: Mood normal.         LABORATORY RESULTS:   No results found for: \"URCOLOR\", \"URCLA\", \"URINELEUK\", \"URINENITRITE\", \"URINEBLOOD\"   Results for orders placed or performed in visit on 11/13/24   ELECTROCARDIOGRAM, COMPLETE    Collection Time: 11/13/24  6:33 PM   Result Value Ref Range    Ventricular rate 69 BPM    Atrial rate 69 BPM    P-R Interval 172 ms    QRS Duration 68 ms    Q-T Interval 406 ms    QTC Calculation (Bezet) 435 ms    P Axis 37 degrees    R Axis 11 degrees     T Axis 6 degrees       EKG / Spirometry : EKG; NSR, HR 69, no st  changes and no previous EKG to compare it to.      Radiology: No results found.    ASSESSMENT/PLAN:   Assessment   Encounter Diagnosis   Name Primary?    Chest pain, unspecified type Yes     None cardiac, costochondritis     MEDICATIONS:     Requested Prescriptions     Signed Prescriptions Disp Refills    ibuprofen 600 MG Oral Tab 60 tablet 0     Sig: Take 1 tablet (600 mg total) by mouth every 6 (six) hours as needed for Pain. Always take it with food.     REFERRALS: ELECTROCARDIOGRAM, COMPLETE,         RECOMMENDATIONS given include: Patient was reassured of  her medical condition and all questions and concerns were answered. Patient was informed to please, call our office with any new or further questions or concerns that may come up in the near future. Notify Dr Mcdermott or the [facility] if there is a deterioration or worsening of the medical condition. Also, inform the doctor with any new symptoms or medications' side effects.  Avoid heavy lifting, carrying.     FOLLOW-UP: Schedule a follow-up visit in 3 weeks with her PCP. .            Orders This Visit:  No orders of the defined types were placed in this encounter.      Meds This Visit:  Requested Prescriptions     Signed Prescriptions Disp Refills    ibuprofen 600 MG Oral Tab 60 tablet 0     Sig: Take 1 tablet (600 mg total) by mouth every 6 (six) hours as needed for Pain. Always take it with food.       Imaging & Referrals:  ELECTROCARDIOGRAM, COMPLETE         ZULEIMA MCDERMOTT MD         [1] No Known Allergies

## 2024-11-13 NOTE — TELEPHONE ENCOUNTER
Patient scheduled a mychart appointment noting the following on appointment notes    Chest pain    Left message to call back and mychart message sent

## 2025-01-23 ENCOUNTER — TELEPHONE (OUTPATIENT)
Age: 35
End: 2025-01-23

## 2025-01-23 ENCOUNTER — NURSE TRIAGE (OUTPATIENT)
Dept: FAMILY MEDICINE CLINIC | Facility: CLINIC | Age: 35
End: 2025-01-23

## 2025-01-23 ENCOUNTER — OFFICE VISIT (OUTPATIENT)
Dept: FAMILY MEDICINE CLINIC | Facility: CLINIC | Age: 35
End: 2025-01-23

## 2025-01-23 VITALS
OXYGEN SATURATION: 98 % | TEMPERATURE: 96 F | WEIGHT: 196 LBS | DIASTOLIC BLOOD PRESSURE: 77 MMHG | BODY MASS INDEX: 30.76 KG/M2 | HEIGHT: 67 IN | SYSTOLIC BLOOD PRESSURE: 125 MMHG | HEART RATE: 74 BPM | RESPIRATION RATE: 20 BRPM

## 2025-01-23 DIAGNOSIS — N64.4 BREAST PAIN, RIGHT: Primary | ICD-10-CM

## 2025-01-23 DIAGNOSIS — Z80.3 FAMILY HISTORY OF BREAST CANCER: ICD-10-CM

## 2025-01-23 PROCEDURE — 3008F BODY MASS INDEX DOCD: CPT | Performed by: FAMILY MEDICINE

## 2025-01-23 PROCEDURE — 3078F DIAST BP <80 MM HG: CPT | Performed by: FAMILY MEDICINE

## 2025-01-23 PROCEDURE — 3074F SYST BP LT 130 MM HG: CPT | Performed by: FAMILY MEDICINE

## 2025-01-23 PROCEDURE — 99213 OFFICE O/P EST LOW 20 MIN: CPT | Performed by: FAMILY MEDICINE

## 2025-01-23 RX ORDER — MELOXICAM 7.5 MG/1
7.5 TABLET ORAL DAILY
Qty: 30 TABLET | Refills: 0 | Status: SHIPPED | OUTPATIENT
Start: 2025-01-23

## 2025-01-23 NOTE — TELEPHONE ENCOUNTER
First attempt-called pt to see if she interested in Genetic counseling no answer and vm had no identifies did not lvm.

## 2025-01-23 NOTE — TELEPHONE ENCOUNTER
Action Requested: Summary for Provider     []  Critical Lab, Recommendations Needed  [] Need Additional Advice  []   FYI    []   Need Orders  [] Need Medications Sent to Pharmacy  []  Other     SUMMARY: Patient calling, she has breast lump that she noticed about 3 days ago. Upper outer breast area near crease of armpit. No fever, not red and no warmth but random pain over the last few days. Patient says lump is about the size of a quarter.   Patient had scheduled visit a few days ago, has period and has cramps today and not feeling her best but she is agreeable to keep the visit. Since painful, advised and encouraged to keep visit for evaluation.   She will call if something changes or she needs to adjust.   Future Appointments   Date Time Provider Department Center   1/23/2025 10:30 AM [facility]         Reason for call: Breast Lump  Onset: about 3 days ago noticed       Reason for Disposition   Breast lump    Protocols used: Breast Symptoms-A-[place]

## 2025-01-23 NOTE — PROGRESS NOTES
Subjective:   Patient ID: Stephanie Belle is a 34 year old female.    HPI  Patient has pain in the right breast   Also feels that breast is slightly more lumpy in that area   Does have a strong family histotry of breast cancer ( states mom had but does not have any further information about this)   History/Other:   Review of Systems    Constitutional: Negative.  Negative for activity change, appetite change, diaphoresis and fatigue.   Breast see hpi   Respiratory: Negative.  Negative for apnea, cough, chest tightness and shortness of breath.    Cardiovascular: Negative.  Negative for chest pain, palpitations and leg swelling.   Gastrointestinal: Negative.  Negative for abdominal pain.     Current Outpatient Medications   Medication Sig Dispense Refill    Meloxicam 7.5 MG Oral Tab Take 1 tablet (7.5 mg total) by mouth daily. 30 tablet 0    ibuprofen 600 MG Oral Tab Take 1 tablet (600 mg total) by mouth every 6 (six) hours as needed for Pain. Always take it with food. 60 tablet 0     Allergies:Allergies[1]    Objective:   Physical Exam  Constitutional:       Appearance: Normal appearance.   Cardiovascular:      Rate and Rhythm: Normal rate and regular rhythm.      Pulses: Normal pulses.      Heart sounds: Normal heart sounds.   Pulmonary:      Effort: Pulmonary effort is normal.      Breath sounds: Normal breath sounds.   Musculoskeletal:      Cervical back: Normal range of motion.   Neurological:      Mental Status: She is alert.     Breast tenderness on the lateral aspect of the breast   No lumps noted though     Assessment & Plan:   1. Breast pain, right    2. Family history of breast cancer    Will do mammogram in light of strong family history   Also start meloxicam every day   Also to see genetic counselor     No orders of the defined types were placed in this encounter.      Meds This Visit:  Requested Prescriptions     Signed Prescriptions Disp Refills    Meloxicam 7.5 MG Oral Tab 30 tablet 0     Sig: Take 1 tablet  (7.5 mg total) by mouth daily.       Imaging & Referrals:  OP REFERRAL TO GENETIC COUNSELOR  [facility] (CPT=[id number])         [1] No Known Allergies

## 2025-02-06 ENCOUNTER — APPOINTMENT (OUTPATIENT)
Age: 35
End: 2025-02-06
Attending: FAMILY MEDICINE
Payer: COMMERCIAL

## 2025-02-26 ENCOUNTER — APPOINTMENT (OUTPATIENT)
Age: 35
End: 2025-02-26
Attending: FAMILY MEDICINE
Payer: COMMERCIAL

## 2025-02-26 ENCOUNTER — OFFICE VISIT (OUTPATIENT)
Age: 35
End: 2025-02-26
Attending: FAMILY MEDICINE
Payer: COMMERCIAL

## 2025-02-26 DIAGNOSIS — Z80.3 FAMILY HISTORY OF MALIGNANT NEOPLASM OF BREAST: Primary | ICD-10-CM

## 2025-02-26 NOTE — PROGRESS NOTES
Reason for visit: Ms. Belle is a 34-year-old woman referred for genetic counseling due to a family history of early-onset breast cancer.  She was seen for genetic counseling and to discuss the option of genetic testing as well as for guidance as to when to begin her breast imaging.  Ms. Belle is  and is employed as a .  She was accompanied to her visit today by her , Rubi.  They are looking to build a family in the near future.  Referring MD: Jeannie Osullivan MD  Relevant family history: Ms. Belle shares that she has two aunts, one on each side of her family who have been diagnosed with breast cancer. Her maternal aunt was diagnosed at age 50 and her paternal aunt at age 48 and again at age 53.  She is otherwise unaware of malignancies on either side of her family.  She does not believe that any family members have pursued genetic testing to date.  She is of [place] ancestry on her maternal side of her family and of [place] ancestry on her paternal side of her family and denies any consanguinity or knowledge of Ashkenazi Hinduism heritage.    Medical history: Ms. Belle has not yet started her breast imaging and is looking for guidance as to when to begin this and which modality to utilize.  She was 12 at menarche and she is nulliparous.  She is pre-menopausal.  Her ovaries and uterus are intact. She admits to consumption of 2 alcoholic beverages weekly and admits to former tobacco use.  She has not yet had a colonoscopy.  She denies uterine fibroids or thyroid disease but shares a recent diagnosis of vitiligo.  She denies any use of hormone replacement therapy but does admit to 2 years of oral contraceptive use. She considers herself overall in overall good health.  Summary:   We discussed hereditary breast cancer with Ms. Belle and her  because of her family history.  Most breast cancer is not hereditary; however, hereditary cancer is suspected under certain conditions, such  as when breast cancer occurs prior to the age of 50 (or premenopausal), when there are multiple affected family members, when breast cancer occurs in combination with other cancers, especially ovarian cancer, and when cancer appears to be passing from generation to generation.      We discussed dominant inheritance, the pattern in which most hereditary cancer conditions are inherited.  If an individual has such a cancer predisposing gene mutation, there is a 50% chance that any offspring will not inherit the mutation and a 50% chance that he or she will inherit it.  Inheriting the mutation does not automatically mean that one will develop cancer, rather that there is an increased chance for developing certain types of cancer.  Cancer predisposing gene mutations can exist in males and females and can be passed on to both male and female offspring.  The pros and cons of cancer predisposing gene mutation testing were reviewed with Ms. Belle and her .  Genetic test results can have significant impact on medical management, planning, screening, surgical decision-making, cancer risk assessment for the patient and relatives, and psychological/emotional well-being.  Mutations in the genes BRCA1 and BRCA2 account for most (but not all) cases of hereditary breast cancer.  Mutations in other genes have also been associated with an increased risk for breast cancer - but mutations in these other genes are statistically less often seen in hereditary breast cancer.    We briefly discussed the benefits and limitations of BRCA1/2 testing versus multi-gene panels that include BRCA1/2.  Panels are an appropriate option for individuals whose history is suggestive of more than one syndrome, and they improve detection rate for identifying the underlying cause of a hereditary cancer.  Limitations of panels include an unknown percentage of variants of unknown significance, as well as an uncertainty of level of risk associated with  certain unknown-penetrance genes, and therefore lack of clear guidelines for risk management of carriers of some of these mutations.  There are panels that assess for mutations in only “high risk” and clinically actionable breast cancer genes as well as larger panels that assess for mutations in high, moderate and unknown-penetrance breast cancer genes.  Genetic testing could yield one of three results: no mutation detected, deleterious mutation detected, or variant of uncertain significance.  The implications of these potential results were reviewed with Ms. Belle and her .  The optimal testing strategy is to test an affected family member first.  We discussed the limitations of interpreting test results of an unaffected individual.  Specifically, a negative result in an unaffected individual is uninformative unless a known mutation has been identified in an affected relative.  She was unclear if her paternal aunt had pursued genetic testing and we reviewed she would be the best person to test.  Another option is testing Ms. Belle rather than an affected relative, and we reviewed the limitations of this testing, including concerns about discrimination by life insurers, long term healthcare or disability, which are not covered by statutes yet.    After discussing the multiple testing options, Ms. Belle decided that she would like to continue to consider the option of molecular genetic testing and she was reassured by the small likelihood of having a hereditary mutation.  Given her paternal aunts' early-onset breast cancer and two diagnoses of breast cancer, she does meet NCCN criteria for genetic testing and this should be a covered benefit of her insurance.   She will continue to consider this option.  In regards to breast imaging, the general recommendation is to begin this 5-10 years prior to the first breast cancer in the family.  As her paternal aunt was diagnosed at 48 years of age, Ms. Belle should  begin breast imaging at age 39.  We also reviewed the option of meeting with a High Risk Breast Cancer specialist once her imaging had been performed to address any concerns identified and to answer questions.  She will plan to consider this once she has begun her breast imaging or if she has any breast-related concerns.   Plan:  Ms. Belle will plan to consider the option of genetic testing. If she elects to pursue this testing, our office is happy to coordinate this process.  She will plan to have her baseline mammogram performed at age 39 unless there is reason to consider this earlier.   She will consider meeting with Dr. Nesbitt/Soraida Parson for a high risk assessment.  Thank you for referring Ms. Belle for genetic counseling; please do not hesitate to contact our office if you have any questions or concerns, [phone].  Send to: Jeannie Osullivan MD  Time spent with patient: 60 minutes

## 2025-03-27 ENCOUNTER — OFFICE VISIT (OUTPATIENT)
Dept: FAMILY MEDICINE CLINIC | Facility: CLINIC | Age: 35
End: 2025-03-27

## 2025-03-27 VITALS
DIASTOLIC BLOOD PRESSURE: 80 MMHG | HEART RATE: 80 BPM | BODY MASS INDEX: 30.92 KG/M2 | WEIGHT: 197 LBS | SYSTOLIC BLOOD PRESSURE: 119 MMHG | HEIGHT: 67 IN

## 2025-03-27 DIAGNOSIS — L02.91 ABSCESS: Primary | ICD-10-CM

## 2025-03-27 DIAGNOSIS — N92.6 IRREGULAR MENSES: ICD-10-CM

## 2025-03-27 LAB
CONTROL LINE PRESENT WITH A CLEAR BACKGROUND (YES/NO): YES YES/NO
KIT LOT #: NORMAL NUMERIC
PREGNANCY TEST, URINE: NEGATIVE

## 2025-03-27 PROCEDURE — 3008F BODY MASS INDEX DOCD: CPT | Performed by: PHYSICIAN ASSISTANT

## 2025-03-27 PROCEDURE — 99213 OFFICE O/P EST LOW 20 MIN: CPT | Performed by: PHYSICIAN ASSISTANT

## 2025-03-27 PROCEDURE — 3074F SYST BP LT 130 MM HG: CPT | Performed by: PHYSICIAN ASSISTANT

## 2025-03-27 PROCEDURE — 81025 URINE PREGNANCY TEST: CPT | Performed by: PHYSICIAN ASSISTANT

## 2025-03-27 PROCEDURE — 3079F DIAST BP 80-89 MM HG: CPT | Performed by: PHYSICIAN ASSISTANT

## 2025-03-27 RX ORDER — CEPHALEXIN 500 MG/1
500 CAPSULE ORAL 4 TIMES DAILY
Qty: 28 CAPSULE | Refills: 0 | Status: SHIPPED | OUTPATIENT
Start: 2025-03-27 | End: 2025-04-03

## 2025-03-27 NOTE — PROGRESS NOTES
HPI:     HPI  A 34-year-old woman presents with a bump on her right breast for the past 2 days. The bump is red, swollen, and painful. It's getting bigger.  She denies fever or drainage.    Medications:     Current Outpatient Medications   Medication Sig Dispense Refill    cephALEXin 500 MG Oral Cap Take 1 capsule (500 mg total) by mouth 4 (four) times daily for 7 days. 28 capsule 0    Meloxicam 7.5 MG Oral Tab Take 1 tablet (7.5 mg total) by mouth daily. (Patient not taking: Reported on 3/27/2025) 30 tablet 0    ibuprofen 600 MG Oral Tab Take 1 tablet (600 mg total) by mouth every 6 (six) hours as needed for Pain. Always take it with food. (Patient not taking: Reported on 3/27/2025) 60 tablet 0       Allergies:   Allergies[1]    History:     Health Maintenance   Topic Date Due    DTaP,Tdap,and Td Vaccines (2 - Tdap) 12/24/2010    Pap Smear  Never done    COVID-19 Vaccine (3 - 2024-25 season) 09/01/2024    Influenza Vaccine (1) Never done    Annual Depression Screening  01/01/2025    Annual Physical  06/06/2025    Pneumococcal Vaccine: Birth to 50yrs  Aged Out    Meningococcal B Vaccine  Aged Out       Patient's last menstrual period was 02/16/2025 (exact date).   Past Medical History:     Past Medical History:    Ovarian cyst       Past Surgical History:     Past Surgical History:   Procedure Laterality Date    Appendectomy  08/11/2024    Laparoscopic Appendectomy placement of 10 flat Remigio Fischer drain       Family History:     Family History   Problem Relation Age of Onset    Lipids Father     Dementia Maternal Grandmother     Cancer Paternal Aunt 48        breast ca; dx again @ 53    Cancer Maternal Aunt 50        breast       Social History:     Social History     Socioeconomic History    Marital status:      Spouse name: Not on file    Number of children: Not on file    Years of education: Not on file    Highest education level: Not on file   Occupational History    Not on file   Tobacco Use    Smoking  status: Never    Smokeless tobacco: Never   Vaping Use    Vaping status: Never Used   Substance and Sexual Activity    Alcohol use: Yes     Comment: social    Drug use: No    Sexual activity: Not on file   Other Topics Concern    Grew up on a farm Not Asked    History of tanning Not Asked    Outdoor occupation Not Asked    Breast feeding No    Reaction to local anesthetic No    Pt has a pacemaker No    Pt has a defibrillator No   Social History Narrative    Not on file     Social Drivers of Health     Food Insecurity: No Food Insecurity (8/11/2024)    Food Insecurity     Food Insecurity: Never true   Transportation Needs: No Transportation Needs (8/11/2024)    Transportation Needs     Lack of Transportation: No     Car Seat: Not on file   Stress: Not on file   Housing Stability: Low Risk  (8/11/2024)    Housing Stability     Housing Instability: No     Housing Instability Emergency: Not on file     Crib or Bassinette: Not on file       Review of Systems:   Review of Systems   + a lump on her right breast.    Vitals:    03/27/25 1302   BP: 119/80   Pulse: 80   Weight: 197 lb (89.4 kg)   Height: 5' 7\" (1.702 m)     Body mass index is 30.85 kg/m².    Physical Exam:   Physical Exam  Vitals reviewed.      There is an abscess 3 cm x 3.5 cm on her right breast. No drainage.    Assessment and Plan::     Assessment & Plan  Abscess    Orders:    cephALEXin 500 MG Oral Cap; Take 1 capsule (500 mg total) by mouth 4 (four) times daily for 7 days.  Advise the patient to apply warm compresses. Advise patient to take Tylenol or Ibuprofen as needed for pain.  Proceed to UC or ER in 2-3 days if worsen.  Irregular menses    Orders:    POC Urine pregnancy test [52763]       Discussed plan of care with pt and pt is in agreement.All questions answered. Pt to call with questions or concerns.         [1] No Known Allergies

## 2025-04-21 ENCOUNTER — OFFICE VISIT (OUTPATIENT)
Dept: FAMILY MEDICINE CLINIC | Facility: CLINIC | Age: 35
End: 2025-04-21

## 2025-04-21 VITALS
SYSTOLIC BLOOD PRESSURE: 90 MMHG | HEART RATE: 67 BPM | DIASTOLIC BLOOD PRESSURE: 60 MMHG | HEIGHT: 67 IN | OXYGEN SATURATION: 97 % | BODY MASS INDEX: 31.18 KG/M2 | WEIGHT: 198.63 LBS | TEMPERATURE: 97 F | RESPIRATION RATE: 16 BRPM

## 2025-04-21 DIAGNOSIS — Z02.1 PRE-EMPLOYMENT EXAMINATION: Primary | ICD-10-CM

## 2025-04-21 NOTE — PROGRESS NOTES
HPI:    Patient ID: Stephanie Belle is a 34 year old female.    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           Review of Systems   Constitutional: Negative.  Negative for fever.   HENT: Negative.  Negative for ear pain and sore throat.    Eyes: Negative.  Negative for visual disturbance.   Respiratory: Negative.  Negative for cough and shortness of breath.    Cardiovascular: Negative.  Negative for chest pain and palpitations.   Gastrointestinal: Negative.  Negative for abdominal pain, blood in stool, constipation and diarrhea.   Genitourinary: Negative.  Negative for dysuria, frequency, hematuria and menstrual problem.   Musculoskeletal: Negative.  Negative for arthralgias and myalgias.   Skin: Negative.  Negative for rash.   Neurological: Negative.  Negative for dizziness, facial asymmetry and headaches.   Psychiatric/Behavioral: Negative.            Current Medications[1]  Allergies:Allergies[2]   BP 90/60 (BP Location: Right arm, Patient Position: Sitting, Cuff Size: adult)   Pulse 67   Temp 96.8 °F (36 °C) (Temporal)   Resp 16   Ht 5' 7\" (1.702 m)   Wt 198 lb 9.6 oz (90.1 kg)   LMP 04/16/2025 (Exact Date)   SpO2 97%   BMI 31.11 kg/m²   Body mass index is 31.11 kg/m².  PHYSICAL EXAM:   Physical Exam  Vitals reviewed.   Constitutional:       General: She is not in acute distress.     Appearance: Normal appearance. She is well-developed. She is not ill-appearing.   HENT:      Head: Normocephalic and atraumatic.      Right Ear: Tympanic membrane, ear canal and external ear normal. There is no impacted cerumen.      Left Ear: Tympanic membrane, ear canal and external ear normal. There is no impacted cerumen.      Nose: Nose normal. No congestion.      Mouth/Throat:      Mouth: Mucous membranes are moist.      Pharynx: Oropharynx is clear. No oropharyngeal exudate or posterior oropharyngeal erythema.   Eyes:      General:         Right eye: No  discharge.         Left eye: No discharge.      Extraocular Movements: Extraocular movements intact.      Conjunctiva/sclera: Conjunctivae normal.      Pupils: Pupils are equal, round, and reactive to light.   Cardiovascular:      Rate and Rhythm: Normal rate and regular rhythm.      Heart sounds: Normal heart sounds. No murmur heard.     No friction rub. No gallop.   Pulmonary:      Effort: Pulmonary effort is normal. No respiratory distress.      Breath sounds: Normal breath sounds. No stridor. No wheezing, rhonchi or rales.   Chest:      Chest wall: No tenderness.   Abdominal:      General: Bowel sounds are normal. There is no distension.      Palpations: Abdomen is soft. There is no mass.      Tenderness: There is no abdominal tenderness. There is no right CVA tenderness, left CVA tenderness, guarding or rebound.      Hernia: No hernia is present.   Genitourinary:     General: Normal vulva.      Vagina: Normal.   Musculoskeletal:         General: No tenderness. Normal range of motion.      Cervical back: Normal range of motion and neck supple.   Lymphadenopathy:      Cervical: No cervical adenopathy.   Skin:     General: Skin is warm and dry.      Findings: No rash.   Neurological:      General: No focal deficit present.      Mental Status: She is alert and oriented to person, place, and time.      Cranial Nerves: No cranial nerve deficit.      Sensory: No sensory deficit.      Motor: No weakness.      Deep Tendon Reflexes: Reflexes are normal and symmetric.   Psychiatric:         Mood and Affect: Mood normal.         Behavior: Behavior normal.         Thought Content: Thought content normal.         Judgment: Judgment normal.                ASSESSMENT/PLAN:   1. Pre-employment examination  - Pre-employment paperwork completed. Copy sent to scanning       No orders of the defined types were placed in this encounter.      Meds This Visit:  Requested Prescriptions      No prescriptions requested or ordered in this  encounter       Imaging & Referrals:  None         ID#[id number]         [1]   No current outpatient medications on file.   [2] No Known Allergies

## 2025-07-23 ENCOUNTER — NURSE TRIAGE (OUTPATIENT)
Dept: FAMILY MEDICINE CLINIC | Facility: CLINIC | Age: 35
End: 2025-07-23

## 2025-07-23 NOTE — TELEPHONE ENCOUNTER
Action Requested: Summary for Provider     []  Critical Lab, Recommendations Needed  [] Need Additional Advice  []   FYI    []   Need Orders  [] Need Medications Sent to Pharmacy  []  Other     SUMMARY:appt made, Pt stated trying to get pregnant, on yesterday-vaginal spotting, no clots,lower right abdominal pain -Hx right ovary problems-6/10, pain is manageable , no meds needed, advised warm compress for cramping/comfort ,stay hydrated,do not have OB/Gyne     Reason for call: Vaginal Bleeding  Onset:yesterday                    Reason for Disposition   Patient wants to be seen    Protocols used: Vaginal Bleeding - [id number]

## 2025-07-24 ENCOUNTER — OFFICE VISIT (OUTPATIENT)
Dept: INTERNAL MEDICINE CLINIC | Facility: CLINIC | Age: 35
End: 2025-07-24

## 2025-07-24 ENCOUNTER — HOSPITAL ENCOUNTER (OUTPATIENT)
Dept: ULTRASOUND IMAGING | Facility: HOSPITAL | Age: 35
Discharge: HOME | End: 2025-07-24
Attending: NURSE PRACTITIONER
Payer: COMMERCIAL

## 2025-07-24 ENCOUNTER — LAB ENCOUNTER (OUTPATIENT)
Dept: LAB | Facility: HOSPITAL | Age: 35
End: 2025-07-24
Attending: NURSE PRACTITIONER
Payer: COMMERCIAL

## 2025-07-24 VITALS
HEIGHT: 67 IN | WEIGHT: 198 LBS | BODY MASS INDEX: 31.08 KG/M2 | HEART RATE: 66 BPM | SYSTOLIC BLOOD PRESSURE: 143 MMHG | OXYGEN SATURATION: 95 % | RESPIRATION RATE: 16 BRPM | DIASTOLIC BLOOD PRESSURE: 93 MMHG

## 2025-07-24 DIAGNOSIS — N94.89 PELVIC CYST IN FEMALE: ICD-10-CM

## 2025-07-24 DIAGNOSIS — R10.2 PELVIC PAIN: ICD-10-CM

## 2025-07-24 DIAGNOSIS — R10.2 PELVIC PAIN: Primary | ICD-10-CM

## 2025-07-24 LAB
ALBUMIN SERPL-MCNC: 4.7 G/DL (ref 3.2–4.8)
ALBUMIN/GLOB SERPL: 1.6 {RATIO} (ref 1–2)
ALP LIVER SERPL-CCNC: 73 U/L (ref 37–98)
ALT SERPL-CCNC: 23 U/L (ref 10–49)
ANION GAP SERPL CALC-SCNC: 6 MMOL/L (ref 0–18)
AST SERPL-CCNC: 18 U/L (ref ?–34)
BASOPHILS # BLD AUTO: 0.06 X10(3) UL (ref 0–0.2)
BASOPHILS NFR BLD AUTO: 0.6 %
BILIRUB SERPL-MCNC: 0.6 MG/DL (ref 0.3–1.2)
BUN BLD-MCNC: 6 MG/DL (ref 9–23)
BUN/CREAT SERPL: 8.5 (ref 10–20)
CALCIUM BLD-MCNC: 9.4 MG/DL (ref 8.7–10.4)
CHLORIDE SERPL-SCNC: 104 MMOL/L (ref 98–112)
CO2 SERPL-SCNC: 28 MMOL/L (ref 21–32)
CREAT BLD-MCNC: 0.71 MG/DL (ref 0.55–1.02)
DEPRECATED RDW RBC AUTO: 38 FL (ref 35.1–46.3)
EGFRCR SERPLBLD CKD-EPI 2021: 115 ML/MIN/1.73M2 (ref 60–?)
EOSINOPHIL # BLD AUTO: 0.09 X10(3) UL (ref 0–0.7)
EOSINOPHIL NFR BLD AUTO: 0.9 %
ERYTHROCYTE [DISTWIDTH] IN BLOOD BY AUTOMATED COUNT: 11.9 % (ref 11–15)
FASTING STATUS PATIENT QL REPORTED: YES
GLOBULIN PLAS-MCNC: 2.9 G/DL (ref 2–3.5)
GLUCOSE BLD-MCNC: 86 MG/DL (ref 70–99)
HCG SERPL QL: NEGATIVE
HCT VFR BLD AUTO: 40.9 % (ref 35–48)
HGB BLD-MCNC: 13.8 G/DL (ref 12–16)
IMM GRANULOCYTES # BLD AUTO: 0.03 X10(3) UL (ref 0–1)
IMM GRANULOCYTES NFR BLD: 0.3 %
LYMPHOCYTES # BLD AUTO: 2.57 X10(3) UL (ref 1–4)
LYMPHOCYTES NFR BLD AUTO: 24.9 %
MCH RBC QN AUTO: 29.9 PG (ref 26–34)
MCHC RBC AUTO-ENTMCNC: 33.7 G/DL (ref 31–37)
MCV RBC AUTO: 88.7 FL (ref 80–100)
MONOCYTES # BLD AUTO: 0.52 X10(3) UL (ref 0.1–1)
MONOCYTES NFR BLD AUTO: 5 %
NEUTROPHILS # BLD AUTO: 7.04 X10 (3) UL (ref 1.5–7.7)
NEUTROPHILS # BLD AUTO: 7.04 X10(3) UL (ref 1.5–7.7)
NEUTROPHILS NFR BLD AUTO: 68.3 %
OSMOLALITY SERPL CALC.SUM OF ELEC: 283 MOSM/KG (ref 275–295)
PLATELET # BLD AUTO: 343 10(3)UL (ref 150–450)
POTASSIUM SERPL-SCNC: 4.1 MMOL/L (ref 3.5–5.1)
PROT SERPL-MCNC: 7.6 G/DL (ref 5.7–8.2)
RBC # BLD AUTO: 4.61 X10(6)UL (ref 3.8–5.3)
SODIUM SERPL-SCNC: 138 MMOL/L (ref 136–145)
WBC # BLD AUTO: 10.3 X10(3) UL (ref 4–11)

## 2025-07-24 PROCEDURE — 3080F DIAST BP >= 90 MM HG: CPT | Performed by: NURSE PRACTITIONER

## 2025-07-24 PROCEDURE — 80053 COMPREHEN METABOLIC PANEL: CPT

## 2025-07-24 PROCEDURE — 84703 CHORIONIC GONADOTROPIN ASSAY: CPT

## 2025-07-24 PROCEDURE — 36415 COLL VENOUS BLD VENIPUNCTURE: CPT

## 2025-07-24 PROCEDURE — 3008F BODY MASS INDEX DOCD: CPT | Performed by: NURSE PRACTITIONER

## 2025-07-24 PROCEDURE — 76830 TRANSVAGINAL US NON-OB: CPT | Performed by: NURSE PRACTITIONER

## 2025-07-24 PROCEDURE — 85025 COMPLETE CBC W/AUTO DIFF WBC: CPT

## 2025-07-24 PROCEDURE — 3077F SYST BP >= 140 MM HG: CPT | Performed by: NURSE PRACTITIONER

## 2025-07-24 PROCEDURE — 76856 US EXAM PELVIC COMPLETE: CPT | Performed by: NURSE PRACTITIONER

## 2025-07-24 PROCEDURE — 99214 OFFICE O/P EST MOD 30 MIN: CPT | Performed by: NURSE PRACTITIONER

## 2025-07-24 NOTE — PROGRESS NOTES
HPI:        The following individual(s) verbally consented to be recorded using ambient AI listening technology and understand that they can each withdraw their consent to this listening technology at any point by asking the clinician to turn off or pause the recording:    Patient name: Stephanie Belle          Patient ID: Stephanie Belle is a 34 year old female.    HPI    History of Present Illness  Stephanie Belle is a 34 year old female who presents with severe pelvic pain and heavy menstrual bleeding.    She began experiencing spotting on Tuesday, followed by severe pelvic pain early Wednesday morning around 1 AM. The pain was significant enough to prevent her from sleeping, described as feeling like her uterus was inflamed and puffy. It later localized to her right ovary, an area where she has experienced pain in the past due to a cyst. The pain is sharp, comes and goes every four to five minutes, and has persisted for two nights.    She is currently on her menstrual period, which started with spotting on Tuesday and progressed to a heavy flow by Wednesday afternoon. Her periods are extremely heavy, particularly on the second day, and she experiences significant pain that radiates to her back. She took Midol to alleviate the pain once the heavy flow began.    She has a history of similar episodes occurring once or twice a year, which are debilitating and have caused her to miss work. She works as a  and is frustrated at being unable to perform her duties due to the pain.    She had a pelvic and vaginal ultrasound in August of the previous year when she had her appendix removed. She has not had a pregnancy test recently but mentions that she and her  have been trying to conceive for about a month. She experiences pressure and pain radiating from her rectum to the front when sitting.    No current constipation. She had her appendix removed last year. She has not seen a gynecologist recently.        Immunization History   Administered Date(s) Administered    Covid-19 Vaccine Pfizer 30 mcg/0.3 ml 08/12/2021, 09/02/2021    DTAP 08/07/1996    HEP B, Ped/Adol 08/06/1997       Past Medical History:    Ovarian cyst      Past Surgical History:   Procedure Laterality Date    Appendectomy  08/11/2024    Laparoscopic Appendectomy placement of 10 flat Remigio Fischer drain      Social History     Socioeconomic History    Marital status:    Tobacco Use    Smoking status: Never    Smokeless tobacco: Never   Vaping Use    Vaping status: Never Used   Substance and Sexual Activity    Alcohol use: Yes     Comment: social    Drug use: No   Other Topics Concern    Breast feeding No    Reaction to local anesthetic No    Pt has a pacemaker No    Pt has a defibrillator No          Review of Systems   Constitutional:  Negative for chills, fatigue and fever.   HENT:  Negative for ear pain, hearing loss, sinus pain, sore throat and trouble swallowing.    Eyes:  Negative for pain and visual disturbance.   Respiratory:  Negative for cough, chest tightness and shortness of breath.    Cardiovascular:  Negative for chest pain, palpitations and leg swelling.   Gastrointestinal:  Negative for abdominal pain, constipation, diarrhea, nausea and vomiting.   Endocrine: Negative for cold intolerance and heat intolerance.   Genitourinary:  Positive for pelvic pain. Negative for dysuria and hematuria.   Musculoskeletal:  Negative for back pain and joint swelling.   Skin:  Negative for rash.   Allergic/Immunologic: Negative for environmental allergies.   Neurological:  Negative for weakness, numbness and headaches.   Hematological:  Does not bruise/bleed easily.   Psychiatric/Behavioral:  Negative for dysphoric mood and sleep disturbance. The patient is not nervous/anxious.               No current outpatient medications on file.     Allergies:No Known Allergies   PHYSICAL EXAM:   Physical Exam  Constitutional:       Appearance: Normal  appearance. She is well-developed.   HENT:      Head: Normocephalic.   Cardiovascular:      Rate and Rhythm: Normal rate and regular rhythm.      Heart sounds: Normal heart sounds. No murmur heard.     No friction rub. No gallop.   Pulmonary:      Effort: Pulmonary effort is normal. No respiratory distress.      Breath sounds: Normal breath sounds. No wheezing, rhonchi or rales.   Abdominal:      General: Bowel sounds are normal. There is no distension.      Palpations: Abdomen is soft. There is no mass.      Tenderness: There is no abdominal tenderness. There is no right CVA tenderness, left CVA tenderness or guarding.   Genitourinary:     Comments: Pelvic Exam:  External Genitalia: normal appearance, hair distribution, and no lesions  Urethral Meatus:  normal in size, location, without lesions and prolapse  Bladder:  No fullness, masses or tenderness  Vagina:  Normal appearance without lesions  Cervix:  Normal without tenderness on motion  Uterus: normal in size, contour, position, mobility, without tenderness  Adnexa: normal without masses or tenderness  Perineum: normal   On her menstrual cycle  Musculoskeletal:         General: No tenderness.      Cervical back: Normal range of motion and neck supple. No tenderness.      Right lower leg: No edema.      Left lower leg: No edema.   Lymphadenopathy:      Cervical: No cervical adenopathy.   Skin:     General: Skin is warm and dry.      Findings: No rash.   Neurological:      Mental Status: She is alert and oriented to person, place, and time.      Coordination: Coordination normal.      Gait: Gait normal.   Psychiatric:         Mood and Affect: Mood normal.         Behavior: Behavior normal.         Thought Content: Thought content normal.         Judgment: Judgment normal.       BP (!) 143/93 (BP Location: Right arm, Patient Position: Sitting, Cuff Size: adult)   Pulse 66   Resp 16   Ht 5' 7\" (1.702 m)   Wt 198 lb (89.8 kg)   LMP 07/23/2025 (Exact Date)    SpO2 95%   BMI 31.01 kg/m²   Wt Readings from Last 2 Encounters:   07/24/25 198 lb (89.8 kg)   04/21/25 198 lb 9.6 oz (90.1 kg)     Body mass index is 31.01 kg/m².(2)  Lab Results   Component Value Date    WBC 16.4 (H) 08/12/2024    RBC 4.34 08/12/2024    HGB 12.4 08/12/2024    HCT 36.2 08/12/2024    MCV 83.4 08/12/2024    MCH 28.6 08/12/2024    MCHC 34.3 08/12/2024    RDW 13.2 08/12/2024    .0 08/12/2024      Lab Results   Component Value Date     (H) 08/12/2024    BUN 6 (L) 08/12/2024    BUNCREA 9.8 (L) 08/12/2024    CREATSERUM 0.61 08/12/2024    ANIONGAP 7 08/12/2024    GFRNAA 125 08/01/2016    GFRAA 145 08/01/2016    CA 8.9 08/12/2024    OSMOCALC 288 08/12/2024    ALKPHO 76 08/12/2024    AST 11 08/12/2024    ALT 13 08/12/2024    BILT 0.5 08/12/2024    TP 7.2 08/12/2024    ALB 4.2 08/12/2024    GLOBULIN 3.0 08/12/2024    AGRATIO 1.3 08/01/2016     08/12/2024    K 3.6 08/12/2024     08/12/2024    CO2 25.0 08/12/2024      Lab Results   Component Value Date     06/06/2024    A1C 5.1 06/06/2024      Lab Results   Component Value Date    CHOLEST 164 06/06/2024    TRIG 98 06/06/2024    HDL 32 (L) 06/06/2024     (H) 06/06/2024    VLDL 17 06/06/2024    NONHDLC 132 (H) 06/06/2024      Lab Results   Component Value Date    TSH 1.480 06/06/2024                ASSESSMENT/PLAN:     Assessment & Plan      No orders of the defined types were placed in this encounter.      Assessment & Plan  Pelvic Pain  Intermittent sharp pelvic pain localized to the right ovary, Differential diagnosis include: endometriosis, ovarian cyst, or menstrual-related issues. Previous ultrasounds ruled out torsion or rupture. Current menstruation may contribute, but severity suggests further investigation. Discussed very early pregnancy loss as a possibility.  - Order STAT pelvic ultrasound to rule out acute issues.  - Refer to gynecology for further evaluation.  - Order CBC, CMP, and pregnancy blood test.  -  Advise ibuprofen, two tablets every eight hours, for pain.  - Instruct to start drinking water and hold it for the ultrasound.    Heavy Menstrual Bleeding  Extremely heavy menstrual bleeding, especially on the second day, possibly related to endometriosis or hormonal imbalances. Severity warrants further evaluation.  - Discuss with gynecology during referral.    Infertility  Concern about conception after one month of trying. Previous pregnancy indicates potential for future conception.  - Reassure that conception can take up to a year and previous pregnancy is reassuring.    Follow-up  Follow-up necessary to evaluate ultrasound and blood test results and determine next steps.  - Follow up with results of ultrasound and blood tests later today.  - Ensure referral to gynecology is made and appointment scheduled.       Meds This Visit:  Requested Prescriptions      No prescriptions requested or ordered in this encounter       Imaging & Referrals:  None         RAIN Rosa

## 2025-07-29 ENCOUNTER — TELEPHONE (OUTPATIENT)
Dept: OBGYN CLINIC | Facility: CLINIC | Age: 35
End: 2025-07-29

## 2025-07-29 ENCOUNTER — OFFICE VISIT (OUTPATIENT)
Dept: OBGYN CLINIC | Facility: CLINIC | Age: 35
End: 2025-07-29
Payer: COMMERCIAL

## 2025-07-29 VITALS
BODY MASS INDEX: 30 KG/M2 | SYSTOLIC BLOOD PRESSURE: 113 MMHG | WEIGHT: 194.19 LBS | DIASTOLIC BLOOD PRESSURE: 72 MMHG | HEART RATE: 66 BPM

## 2025-07-29 DIAGNOSIS — N83.201 CYST OF RIGHT OVARY: Primary | ICD-10-CM

## 2025-07-29 DIAGNOSIS — Z12.4 SCREENING FOR CERVICAL CANCER: ICD-10-CM

## 2025-07-29 DIAGNOSIS — R10.2 PELVIC PAIN: ICD-10-CM

## 2025-07-29 PROCEDURE — 3074F SYST BP LT 130 MM HG: CPT | Performed by: NURSE PRACTITIONER

## 2025-07-29 PROCEDURE — 3078F DIAST BP <80 MM HG: CPT | Performed by: NURSE PRACTITIONER

## 2025-07-29 PROCEDURE — 99214 OFFICE O/P EST MOD 30 MIN: CPT | Performed by: NURSE PRACTITIONER

## 2025-07-30 ENCOUNTER — LAB ENCOUNTER (OUTPATIENT)
Dept: LAB | Facility: HOSPITAL | Age: 35
End: 2025-07-30
Attending: OBSTETRICS & GYNECOLOGY

## 2025-07-30 ENCOUNTER — OFFICE VISIT (OUTPATIENT)
Dept: OBGYN CLINIC | Facility: CLINIC | Age: 35
End: 2025-07-30
Payer: COMMERCIAL

## 2025-07-30 VITALS
BODY MASS INDEX: 30 KG/M2 | DIASTOLIC BLOOD PRESSURE: 72 MMHG | WEIGHT: 194 LBS | SYSTOLIC BLOOD PRESSURE: 111 MMHG | HEART RATE: 70 BPM

## 2025-07-30 DIAGNOSIS — N83.201 CYST OF RIGHT OVARY: ICD-10-CM

## 2025-07-30 DIAGNOSIS — N83.201 CYST OF RIGHT OVARY: Primary | ICD-10-CM

## 2025-07-30 LAB
CANCER AG125 SERPL-ACNC: 85 U/ML (ref ?–30.2)
CEA SERPL-MCNC: <0.5 NG/ML (ref ?–5)
HPV E6+E7 MRNA CVX QL NAA+PROBE: NEGATIVE

## 2025-07-30 PROCEDURE — 3078F DIAST BP <80 MM HG: CPT | Performed by: OBSTETRICS & GYNECOLOGY

## 2025-07-30 PROCEDURE — 3074F SYST BP LT 130 MM HG: CPT | Performed by: OBSTETRICS & GYNECOLOGY

## 2025-07-30 PROCEDURE — 99214 OFFICE O/P EST MOD 30 MIN: CPT | Performed by: OBSTETRICS & GYNECOLOGY

## 2025-07-30 PROCEDURE — 36415 COLL VENOUS BLD VENIPUNCTURE: CPT

## 2025-07-30 PROCEDURE — 82378 CARCINOEMBRYONIC ANTIGEN: CPT

## 2025-07-30 PROCEDURE — 86304 IMMUNOASSAY TUMOR CA 125: CPT

## (undated) DEVICE — [HIGH FLOW INSUFFLATOR,  DO NOT USE IF PACKAGE IS DAMAGED,  KEEP DRY,  KEEP AWAY FROM SUNLIGHT,  PROTECT FROM HEAT AND RADIOACTIVE SOURCES.]: Brand: PNEUMOSURE

## (undated) DEVICE — TISSUE RETRIEVAL SYSTEM: Brand: INZII RETRIEVAL SYSTEM

## (undated) DEVICE — POWER SHELL: Brand: SIGNIA

## (undated) DEVICE — ARTICULATION RELOAD WITH TRI-STAPLE TECHNOLOGY: Brand: ENDO GIA

## (undated) DEVICE — PLUMEPORT ACTIV LAPAROSCOPIC SMOKE FILTRATION DEVICE: Brand: PLUMEPORT ACTIVE

## (undated) DEVICE — LAP CHOLE: Brand: MEDLINE INDUSTRIES, INC.

## (undated) DEVICE — SUT ETHLN 3-0 30IN FS-1 NABSRB BLK 24MM 3/8 C

## (undated) DEVICE — 3M™ TEGADERM™ TRANSPARENT FILM DRESSING FRAME STYLE, 1626W, 4 IN X 4-3/4 IN (10 CM X 12 CM), 50/CT 4CT/CASE: Brand: 3M™ TEGADERM™

## (undated) DEVICE — KIT,ANTI FOG,W/SPONGE & FLUID,SOFT PACK: Brand: MEDLINE

## (undated) DEVICE — AEGIS 1" DISK 4MM HOLE, PEEL OPEN: Brand: MEDLINE

## (undated) DEVICE — SPONGE 4X4 10PK

## (undated) DEVICE — SYRINGE MED 10ML LL TIP W/O SFTY DISP

## (undated) DEVICE — GLOVE SUR 6.5 SENSICARE PI MIC PIP CRM PWD F

## (undated) DEVICE — TROCARS: Brand: KII® BALLOON BLUNT TIP SYSTEM

## (undated) DEVICE — EVACUATOR SUR 100CC SIL BLB WND

## (undated) DEVICE — SUT COAT VCRL+ 0 27IN UR-6 ABSRB VLT ANTIBACT

## (undated) DEVICE — TROCAR: Brand: KII FIOS FIRST ENTRY

## (undated) DEVICE — UNDYED BRAIDED (POLYGLACTIN 910), SYNTHETIC ABSORBABLE SUTURE: Brand: COATED VICRYL

## (undated) DEVICE — ADHESIVE SKIN TOP FOR WND CLSR DERMBND ADV

## (undated) DEVICE — 5 MM BABCOCKS WITH RATCHET HANDLES: Brand: ENDOPATH

## (undated) DEVICE — SOLUTION IV 1000ML 0.9% NACL PRESERVATIVE

## (undated) DEVICE — SOLUTION IRRIG 1000ML 0.9% NACL USP BTL

## (undated) DEVICE — BLACK INTELLIGENT RELOAD: Brand: TRI-STAPLE 2.0

## (undated) DEVICE — DRAIN SUR W10MMXL20CM SIL FULL PERF HUBLESS

## (undated) DEVICE — GLOVE SUR 6.5 SENSICARE PI PIP GRN PWD F

## (undated) DEVICE — MARYLAND JAW LAPAROSCOPIC SEALER/DIVIDER COATED: Brand: LIGASURE

## (undated) DEVICE — TROCAR: Brand: KII® SLEEVE

## (undated) NOTE — LETTER
[facility]  [address], [place], [place]    Authorization for Surgical Operation and Procedure                               I hereby authorize Lorena Rios MD, my physician and his/her assistants (if applicable), which may include medical students, residents, and/or fellows, to perform the following surgical operation/ procedure and administer such anesthesia as may be determined necessary by my physician: Operation/Procedure name (s) LAPAROSCOPIC POSSIBLE OPEN APPENDECTOMY on Stephanie Belle   2.   I recognize that during the surgical operation/procedure, unforeseen conditions may necessitate additional or different procedures than those listed above.  I, therefore, further authorize and request that the above-named surgeon, assistants, or designees perform such procedures as are, in their judgment, necessary and desirable.    3.   My surgeon/physician has discussed prior to my surgery the potential benefits, risks and side effects of this procedure; the likelihood of achieving goals; and potential problems that might occur during recuperation.  They also discussed reasonable alternatives to the procedure, including risks, benefits, and side effects related to the alternatives and risks related to not receiving this procedure.  I have had all my questions answered and I acknowledge that no guarantee has been made as to the result that may be obtained.    4.   Should the need arise during my operation/procedure, which includes change of level of care prior to discharge, I also consent to the administration of blood and/or blood products.  Further, I understand that despite careful testing and screening of blood or blood products by collecting agencies, I may still be subject to ill effects as a result of receiving a blood transfusion and/or blood products.  The following are some, but not all, of the potential risks that can occur: fever and allergic reactions, hemolytic reactions,  transmission of diseases such as Hepatitis, AIDS and Cytomegalovirus (CMV) and fluid overload.  In the event that I wish to have an autologous transfusion of my own blood, or a directed donor transfusion, I will discuss this with my physician.  Check only if Refusing Blood or Blood Products  I understand refusal of blood or blood products as deemed necessary by my physician may have serious consequences to my condition to include possible death. I hereby assume responsibility for my refusal and release the hospital, its personnel, and my physicians from any responsibility for the consequences of my refusal.    o  Refuse   5.   I authorize the use of any specimen, organs, tissues, body parts or foreign objects that may be removed from my body during the operation/procedure for diagnosis, research or teaching purposes and their subsequent disposal by hospital authorities.  I also authorize the release of specimen test results and/or written reports to my treating physician on the hospital medical staff or other referring or consulting physicians involved in my care, at the discretion of the Pathologist or my treating physician.    6.   I consent to the photographing or videotaping of the operations or procedures to be performed, including appropriate portions of my body for medical, scientific, or educational purposes, provided my identity is not revealed by the pictures or by descriptive texts accompanying them.  If the procedure has been photographed/videotaped, the surgeon will obtain the original picture, image, videotape or CD.  The hospital will not be responsible for storage, release or maintenance of the picture, image, tape or CD.    7.   I consent to the presence of a  or observers in the operating room as deemed necessary by my physician or their designees.    8.   I recognize that in the event my procedure results in extended X-Ray/fluoroscopy time, I may develop a skin reaction.    9. If  I have a Do Not Attempt Resuscitation (DNAR) order in place, that status will be suspended while in the operating room, procedural suite, and during the recovery period unless otherwise explicitly stated by me (or a person authorized to consent on my behalf). The surgeon or my attending physician will determine when the applicable recovery period ends for purposes of reinstating the DNAR order.  10. Patients having a sterilization procedure: I understand that if the procedure is successful the results will be permanent and it will therefore be impossible for me to inseminate, conceive, or bear children.  I also understand that the procedure is intended to result in sterility, although the result has not been guaranteed.   11. I acknowledge that my physician has explained sedation/analgesia administration to me including the risk and benefits I consent to the administration of sedation/analgesia as may be necessary or desirable in the judgment of my physician.    I CERTIFY THAT I HAVE READ AND FULLY UNDERSTAND THE ABOVE CONSENT TO OPERATION and/or OTHER PROCEDURE.     ____________________________________  _________________________________        ______________________________  Signature of Patient    Signature of Responsible Person                Printed Name of Responsible Person                                      ____________________________________  _____________________________                ________________________________  Signature of Witness        Date  Time         Relationship to Patient    STATEMENT OF PHYSICIAN My signature below affirms that prior to the time of the procedure; I have explained to the patient and/or his/her legal representative, the risks and benefits involved in the proposed treatment and any reasonable alternative to the proposed treatment. I have also explained the risks and benefits involved in refusal of the proposed treatment and alternatives to the proposed treatment and have  answered the patient's questions. If I have a significant financial interest in a co-management agreement or a significant financial interest in any product or implant, or other significant relationship used in this procedure/surgery, I have disclosed this and had a discussion with my patient.     _____________________________________________________              _____________________________  (Signature of Physician)                                                                                         (Date)                                   (Time)  Patient Name: Stephanie Belle      : 1990      Printed: 2024     Medical Record #: [id number]                                      Page 1 of 1

## (undated) NOTE — LETTER
9/10/2024          To Whom It May Concern:    Stephanie Belle is currently under my medical care and may return to work on Monday, September 16, 2024.    Activity is restricted as follows: none.  If you require additional information please contact our office.        Sincerely,          Lorena Rios MD          Document generated by:  CRISSY PHAM

## (undated) NOTE — LETTER
[facility]  Administration of Anesthesia  Stephanie JENKINS agree to be cared for by a physician anesthesiologist alone and/or with a nurse anesthetist, who is specially trained to monitor me and give me medicine to put me to sleep or keep me comfortable during my procedure    I understand that my anesthesiologist and/or anesthetist is not an employee or agent of [facility] or [employer]. He or she works for [facility]    As the patient asking for anesthesia services, I agree to:  Allow the anesthesiologist (anesthesia doctor) to give me medicine and do additional procedures as necessary. Some examples are: Starting or using an “IV” to give me medicine, fluids or blood during my procedure, and having a breathing tube placed to help me breathe when I’m asleep (intubation). In the event that my heart stops working properly, I understand that my anesthesiologist will make every effort to sustain my life, unless otherwise directed by [facility] Do Not Resuscitate documents.  Tell my anesthesia doctor before my procedure:  If I am pregnant.  The last time that I ate or drank.  iii. All of the medicines I take (including prescriptions, herbal supplements, and pills I can buy without a prescription (including street drugs/illegal medications). Failure to inform my anesthesiologist about these medicines may increase my risk of anesthetic complications.  iv.If I am allergic to anything or have had a reaction to anesthesia before.  I understand how the anesthesia medicine will help me (benefits).  I understand that with any type of anesthesia medicine there are risks:  The most common risks are: nausea, vomiting, sore throat, muscle soreness, damage to my eyes, mouth, or teeth (from breathing tube placement).  Rare risks include: remembering what happened during my procedure, allergic reactions to medications, injury to my airway, heart, lungs, vision, nerves, or muscles  and in extremely rare instances death.  My doctor has explained to me other choices available to me for my care (alternatives).  Pregnant Patients (“epidural”):  I understand that the risks of having an epidural (medicine given into my back to help control pain during labor), include itching, low blood pressure, difficulty urinating, headache or slowing of the baby’s heart. Very rare risks include infection, bleeding, seizure, irregular heart rhythms and nerve injury.  Regional Anesthesia (“spinal”, “epidural”, & “nerve blocks”):  I understand that rare but potential complications include headache, bleeding, infection, seizure, irregular heart rhythms, and nerve injury.    _____________________________________________________________________________  Patient (or Representative) Signature/Relationship to Patient  Date   Time    _____________________________________________________________________________   Name (if used)    Language/Organization   Time    _____________________________________________________________________________  Nurse Anesthetist Signature     Date   Time  _____________________________________________________________________________  Anesthesiologist Signature     Date   Time  I have discussed the procedure and information above with the patient (or patient’s representative) and answered their questions. The patient or their representative has agreed to have anesthesia services.    _____________________________________________________________________________  Witness        Date   Time  I have verified that the signature is that of the patient or patient’s representative, and that it was signed before the procedure  Patient Name: Stephanie Belle     : 1990                 Printed: 2024 at 4:08 PM    Medical Record #: [id number]                                            Page 1 of 1  ----------ANESTHESIA CONSENT----------

## (undated) NOTE — LETTER
8/21/2024          To Whom It May Concern:    Stephanie Belle is currently under my medical care and may not return to work untilAugust 26, 2024.    Activity is restricted as follows: light duty until September 16, 2024.  If you require additional information please contact our office.        Sincerely,            Edy Art PA-C          Document generated by:  CRISSY PHAM

## (undated) NOTE — LETTER
6/3/2022          To Whom It May Concern:    Halie Gold is currently under my medical care and may not return to work at this time. Please excuse Wei Quintana for 5 days. starting 6/1/2022  She may return to work on 6/6/2022. Activity is restricted as follows: none. If you require additional information please contact our office. Sincerely,      Cyndi Hidalgo MD          Document generated by:   Cyndi Hidalgo MD

## (undated) NOTE — LETTER
12/16/2022          To Whom It May Concern:    Claudy Zavala is currently under my medical care and may not return to work until next evaluation on 12/20/2022. If you require additional information please contact our office. Sincerely,      Everton Simmons MD          Document generated by:   Everton Simmons MD

## (undated) NOTE — LETTER
6/1/2022      To Whom It May Concern:    Caitie Henao is currently under my medical care and may not return to work at this time. Please excuse Brenda Bravo for 7 days, 5/30, 5/31, 6/1, 6/2, 6/3, 6/4 and 6/5. She may return to work on 6/6/22. Activity is restricted as follows: none. If you require additional information please contact our office.       Sincerely,      Zeenat Garcia MD  [facility] [address]  [facility] [address]   [phone]      Document generated by:  Ning Bryant RN

## (undated) NOTE — LETTER
5/29/2020          To Whom It May Concern:    Marnie Mcgarry is currently under my medical care and may not return to work at this time. Please excuse Adi Ellis for 2 days. She may return to work when her test results are received and the results are known. Activity is restricted as follows: none. If you require additional information please contact our office.         Sincerely,      Mary Arellano PA-C          Document generated by:  Mary Arellano

## (undated) NOTE — LETTER
To Whom It May Concern:    Stephanie Belle is currently under medical care and may not return to work at this time.  Please excuse Stephanie for 6 weeks. She may return to work on ***.    Activity is restricted as follows: {EM SCHOOL/WORK RESTRICTIONS:[id number]}.    If you require additional information please contact our office.    Sincerely,    PING Romero MD  [facility]  [address] [facility] [address]  [phone]        Document generated by:  Tyra WEI RN